# Patient Record
Sex: MALE | Race: WHITE | NOT HISPANIC OR LATINO | URBAN - METROPOLITAN AREA
[De-identification: names, ages, dates, MRNs, and addresses within clinical notes are randomized per-mention and may not be internally consistent; named-entity substitution may affect disease eponyms.]

---

## 2017-06-22 ENCOUNTER — OUTPATIENT (OUTPATIENT)
Dept: OUTPATIENT SERVICES | Facility: HOSPITAL | Age: 27
LOS: 1 days | End: 2017-06-22
Payer: COMMERCIAL

## 2017-06-22 DIAGNOSIS — K08.9 DISORDER OF TEETH AND SUPPORTING STRUCTURES, UNSPECIFIED: ICD-10-CM

## 2017-06-22 PROCEDURE — D1110: CPT

## 2017-06-22 PROCEDURE — D0120: CPT

## 2017-06-26 DIAGNOSIS — Z01.20 ENCOUNTER FOR DENTAL EXAMINATION AND CLEANING WITHOUT ABNORMAL FINDINGS: ICD-10-CM

## 2017-07-31 ENCOUNTER — INPATIENT (INPATIENT)
Facility: HOSPITAL | Age: 27
LOS: 2 days | Discharge: HOME | DRG: 394 | End: 2017-08-03
Attending: INTERNAL MEDICINE | Admitting: INTERNAL MEDICINE
Payer: COMMERCIAL

## 2017-07-31 VITALS
HEART RATE: 77 BPM | DIASTOLIC BLOOD PRESSURE: 59 MMHG | RESPIRATION RATE: 22 BRPM | WEIGHT: 104.94 LBS | SYSTOLIC BLOOD PRESSURE: 74 MMHG | HEIGHT: 60 IN | OXYGEN SATURATION: 97 %

## 2017-07-31 DIAGNOSIS — L03.90 CELLULITIS, UNSPECIFIED: ICD-10-CM

## 2017-07-31 DIAGNOSIS — L03.311 CELLULITIS OF ABDOMINAL WALL: ICD-10-CM

## 2017-07-31 LAB
ALBUMIN SERPL ELPH-MCNC: 4.1 G/DL — SIGNIFICANT CHANGE UP (ref 3.3–5)
ALP SERPL-CCNC: 59 U/L — SIGNIFICANT CHANGE UP (ref 40–120)
ALT FLD-CCNC: 20 U/L RC — SIGNIFICANT CHANGE UP (ref 10–45)
ANION GAP SERPL CALC-SCNC: 15 MMOL/L — SIGNIFICANT CHANGE UP (ref 5–17)
APPEARANCE UR: CLEAR — SIGNIFICANT CHANGE UP
AST SERPL-CCNC: 26 U/L — SIGNIFICANT CHANGE UP (ref 10–40)
BASOPHILS # BLD AUTO: 0 K/UL — SIGNIFICANT CHANGE UP (ref 0–0.2)
BASOPHILS NFR BLD AUTO: 0.2 % — SIGNIFICANT CHANGE UP (ref 0–2)
BILIRUB SERPL-MCNC: 0.3 MG/DL — SIGNIFICANT CHANGE UP (ref 0.2–1.2)
BILIRUB UR-MCNC: NEGATIVE — SIGNIFICANT CHANGE UP
BUN SERPL-MCNC: 14 MG/DL — SIGNIFICANT CHANGE UP (ref 7–23)
CALCIUM SERPL-MCNC: 10.1 MG/DL — SIGNIFICANT CHANGE UP (ref 8.4–10.5)
CHLORIDE SERPL-SCNC: 95 MMOL/L — LOW (ref 96–108)
CO2 SERPL-SCNC: 26 MMOL/L — SIGNIFICANT CHANGE UP (ref 22–31)
COLOR SPEC: YELLOW — SIGNIFICANT CHANGE UP
CREAT SERPL-MCNC: 0.39 MG/DL — LOW (ref 0.5–1.3)
DIFF PNL FLD: NEGATIVE — SIGNIFICANT CHANGE UP
EOSINOPHIL # BLD AUTO: 0 K/UL — SIGNIFICANT CHANGE UP (ref 0–0.5)
EOSINOPHIL NFR BLD AUTO: 0.1 % — SIGNIFICANT CHANGE UP (ref 0–6)
GAS PNL BLDV: SIGNIFICANT CHANGE UP
GLUCOSE SERPL-MCNC: 99 MG/DL — SIGNIFICANT CHANGE UP (ref 70–99)
GLUCOSE UR QL: NEGATIVE — SIGNIFICANT CHANGE UP
HCT VFR BLD CALC: 48.3 % — SIGNIFICANT CHANGE UP (ref 39–50)
HGB BLD-MCNC: 16.3 G/DL — SIGNIFICANT CHANGE UP (ref 13–17)
KETONES UR-MCNC: NEGATIVE — SIGNIFICANT CHANGE UP
LEUKOCYTE ESTERASE UR-ACNC: NEGATIVE — SIGNIFICANT CHANGE UP
LYMPHOCYTES # BLD AUTO: 17.5 % — SIGNIFICANT CHANGE UP (ref 13–44)
LYMPHOCYTES # BLD AUTO: 3.4 K/UL — HIGH (ref 1–3.3)
MCHC RBC-ENTMCNC: 31.6 PG — SIGNIFICANT CHANGE UP (ref 27–34)
MCHC RBC-ENTMCNC: 33.8 GM/DL — SIGNIFICANT CHANGE UP (ref 32–36)
MCV RBC AUTO: 93.4 FL — SIGNIFICANT CHANGE UP (ref 80–100)
MONOCYTES # BLD AUTO: 2.3 K/UL — HIGH (ref 0–0.9)
MONOCYTES NFR BLD AUTO: 11.7 % — SIGNIFICANT CHANGE UP (ref 2–14)
NEUTROPHILS # BLD AUTO: 13.8 K/UL — HIGH (ref 1.8–7.4)
NEUTROPHILS NFR BLD AUTO: 70.4 % — SIGNIFICANT CHANGE UP (ref 43–77)
NITRITE UR-MCNC: NEGATIVE — SIGNIFICANT CHANGE UP
PH UR: 7 — SIGNIFICANT CHANGE UP (ref 5–8)
PLATELET # BLD AUTO: 360 K/UL — SIGNIFICANT CHANGE UP (ref 150–400)
POTASSIUM SERPL-MCNC: 4.6 MMOL/L — SIGNIFICANT CHANGE UP (ref 3.5–5.3)
POTASSIUM SERPL-SCNC: 4.6 MMOL/L — SIGNIFICANT CHANGE UP (ref 3.5–5.3)
PROT SERPL-MCNC: 7.9 G/DL — SIGNIFICANT CHANGE UP (ref 6–8.3)
PROT UR-MCNC: 30 MG/DL
RBC # BLD: 5.18 M/UL — SIGNIFICANT CHANGE UP (ref 4.2–5.8)
RBC # FLD: 12.2 % — SIGNIFICANT CHANGE UP (ref 10.3–14.5)
RBC CASTS # UR COMP ASSIST: SIGNIFICANT CHANGE UP /HPF (ref 0–2)
SODIUM SERPL-SCNC: 136 MMOL/L — SIGNIFICANT CHANGE UP (ref 135–145)
SP GR SPEC: 1.02 — SIGNIFICANT CHANGE UP (ref 1.01–1.02)
UROBILINOGEN FLD QL: NEGATIVE — SIGNIFICANT CHANGE UP
WBC # BLD: 19.5 K/UL — HIGH (ref 3.8–10.5)
WBC # FLD AUTO: 19.5 K/UL — HIGH (ref 3.8–10.5)
WBC UR QL: SIGNIFICANT CHANGE UP /HPF (ref 0–5)

## 2017-07-31 PROCEDURE — 93010 ELECTROCARDIOGRAM REPORT: CPT

## 2017-07-31 PROCEDURE — 74000: CPT | Mod: 26

## 2017-07-31 PROCEDURE — 99285 EMERGENCY DEPT VISIT HI MDM: CPT | Mod: 25

## 2017-07-31 PROCEDURE — 99222 1ST HOSP IP/OBS MODERATE 55: CPT

## 2017-07-31 PROCEDURE — 71010: CPT | Mod: 26

## 2017-07-31 RX ORDER — HEPARIN SODIUM 5000 [USP'U]/ML
5000 INJECTION INTRAVENOUS; SUBCUTANEOUS EVERY 12 HOURS
Qty: 0 | Refills: 0 | Status: DISCONTINUED | OUTPATIENT
Start: 2017-07-31 | End: 2017-08-03

## 2017-07-31 RX ORDER — VANCOMYCIN HCL 1 G
1000 VIAL (EA) INTRAVENOUS ONCE
Qty: 0 | Refills: 0 | Status: COMPLETED | OUTPATIENT
Start: 2017-07-31 | End: 2017-07-31

## 2017-07-31 RX ORDER — CEFEPIME 1 G/1
2000 INJECTION, POWDER, FOR SOLUTION INTRAMUSCULAR; INTRAVENOUS EVERY 12 HOURS
Qty: 0 | Refills: 0 | Status: DISCONTINUED | OUTPATIENT
Start: 2017-07-31 | End: 2017-08-03

## 2017-07-31 RX ORDER — SODIUM CHLORIDE 9 MG/ML
1000 INJECTION, SOLUTION INTRAVENOUS ONCE
Qty: 0 | Refills: 0 | Status: COMPLETED | OUTPATIENT
Start: 2017-07-31 | End: 2017-07-31

## 2017-07-31 RX ORDER — PANTOPRAZOLE SODIUM 20 MG/1
40 TABLET, DELAYED RELEASE ORAL DAILY
Qty: 0 | Refills: 0 | Status: DISCONTINUED | OUTPATIENT
Start: 2017-07-31 | End: 2017-08-03

## 2017-07-31 RX ORDER — BACITRACIN ZINC 500 UNIT/G
1 OINTMENT IN PACKET (EA) TOPICAL
Qty: 0 | Refills: 0 | Status: DISCONTINUED | OUTPATIENT
Start: 2017-07-31 | End: 2017-08-03

## 2017-07-31 RX ORDER — CEFEPIME 1 G/1
2000 INJECTION, POWDER, FOR SOLUTION INTRAMUSCULAR; INTRAVENOUS ONCE
Qty: 0 | Refills: 0 | Status: COMPLETED | OUTPATIENT
Start: 2017-07-31 | End: 2017-07-31

## 2017-07-31 RX ORDER — VALPROIC ACID (AS SODIUM SALT) 250 MG/5ML
250 SOLUTION, ORAL ORAL EVERY 8 HOURS
Qty: 0 | Refills: 0 | Status: DISCONTINUED | OUTPATIENT
Start: 2017-07-31 | End: 2017-08-02

## 2017-07-31 RX ORDER — IPRATROPIUM/ALBUTEROL SULFATE 18-103MCG
3 AEROSOL WITH ADAPTER (GRAM) INHALATION EVERY 6 HOURS
Qty: 0 | Refills: 0 | Status: DISCONTINUED | OUTPATIENT
Start: 2017-07-31 | End: 2017-08-03

## 2017-07-31 RX ORDER — VALPROIC ACID (AS SODIUM SALT) 250 MG/5ML
400 SOLUTION, ORAL ORAL ONCE
Qty: 0 | Refills: 0 | Status: COMPLETED | OUTPATIENT
Start: 2017-07-31 | End: 2017-07-31

## 2017-07-31 RX ORDER — SODIUM CHLORIDE 9 MG/ML
1000 INJECTION, SOLUTION INTRAVENOUS
Qty: 0 | Refills: 0 | Status: DISCONTINUED | OUTPATIENT
Start: 2017-07-31 | End: 2017-08-03

## 2017-07-31 RX ADMIN — HEPARIN SODIUM 5000 UNIT(S): 5000 INJECTION INTRAVENOUS; SUBCUTANEOUS at 21:56

## 2017-07-31 RX ADMIN — Medication 250 MILLIGRAM(S): at 13:59

## 2017-07-31 RX ADMIN — SODIUM CHLORIDE 1000 MILLILITER(S): 9 INJECTION, SOLUTION INTRAVENOUS at 13:59

## 2017-07-31 RX ADMIN — Medication 26.25 MILLIGRAM(S): at 21:56

## 2017-07-31 RX ADMIN — SODIUM CHLORIDE 100 MILLILITER(S): 9 INJECTION, SOLUTION INTRAVENOUS at 21:57

## 2017-07-31 RX ADMIN — CEFEPIME 100 MILLIGRAM(S): 1 INJECTION, POWDER, FOR SOLUTION INTRAMUSCULAR; INTRAVENOUS at 15:11

## 2017-07-31 NOTE — ED PROVIDER NOTE - ATTENDING CONTRIBUTION TO CARE
Attending MD Mosher:  I personally have seen and examined this patient.  Resident note reviewed and agree on plan of care and except where noted.  See HPI, PE, and MDM for details.              26M with CP, seizure disorder presenting from home with report of dislodged G tube, replaced by pt's mother 1 day prior, pt arrives tachycardic with low grade hypoxia, small amount of erythema around G tube site, will perform G tube study, rectal temp, labs to screen for possible sepsis

## 2017-07-31 NOTE — CONSULT NOTE ADULT - SUBJECTIVE AND OBJECTIVE BOX
HPI: 26 M PMh CP, developmental delay, seizures, Scoliosis, RAD       PAST MEDICAL & SURGICAL HISTORY:  History of Scoliosis  Mental Retardation  CP (Cerebral Palsy)  RAD (Reactive Airway Disease)  Seizure Disorder  Hip Dislocation: s/p repair ,   Tendon Contracture: ankles b/l  Status Post Unilateral Inguinal Hernia Repair: Undescended testes b/l  Status Post Myringotomy with Insertion of Tube: 1999x2, 1994x1,1995x1, 1996x1,1998 x2  Status Post Nissen Fundoplication (with Gastrostomy Tube Placement)  Status Post Nissen Fundoplication (with Gastrostomy Tube Placement)  Status Post Gastrostomy Tube (G Tube) Placement, Follow-Up Exam      Allergies  No Known Allergies        ANTIMICROBIALS:      OTHER MEDS: MEDICATIONS  (STANDING):  valproic  acid Syrup 400 Once      SOCIAL HISTORY:  [ ] etoh [ ] tobacco [ ] former smoker [ ] IVDU    FAMILY HISTORY:      REVIEW OF SYSTEMS  [  ] ROS unobtainable because:  nonverbal  [  ] All other systems negative except as noted below:	    Constitutional:  [ ] fever [ ] weight loss  Skin:  [ ] rash [ ] phlebitis	  Eyes: [ ] icterus [ ] inflammation	  ENMT: [ ] discharge [ ] thrush [ ] ulcers [ ] exudates  Respiratory: [ ] dyspnea [ ] hemoptysis [ ] cough [ ] sputum	  Cardiovascular:  [ ] chest pain [ ] palpitations [ ] edema	  Gastrointestinal:  [ ] nausea [ ] vomiting [ ] diarrhea [ ] constipation [ ] pain	  Genitourinary:  [ ] dysuria [ ] frequency [ ] hematuria [ ] discharge [ ] flank pain  Musculoskeletal:  [ ] myalgias [ ] arthralgias [ ] arthritis	  Neurological:  [ ] headache [ ] seizures	  Psychiatric:  [ ] anxiety [ ] depression	  Hematology/Lymphatics:  [ ] lymphadenopathy  Endocrine:  [ ] adrenal [ ] thyroid  Allergic/Immunologic:	 [ ] transplant [ ] seasonal    Vital Signs Last 24 Hrs  T(F): 99.7 (17 @ 13:59), Max: 99.7 (17 @ 13:59)    Vital Signs Last 24 Hrs  HR: 108 (17 @ 15:11) (77 - 115)  BP: 106/80 (17 @ 13:59) (74/59 - 115/78)  RR: 18 (17 @ 15:11)  SpO2: 98% (17 @ 15:11) (97% - 98%)  Wt(kg): --    PHYSICAL EXAM:  General: non-toxic  HEAD/EYES: anicteric, PERRL  ENT:  supple  Cardiovascular:   S1, S2  Respiratory:  clear bilaterally  GI:  soft, non-tender, normal bowel sounds  :  no CVA tenderness   Musculoskeletal:  no synovitis  Neurologic:  grossly non-focal  Skin:  no rash  Lymph: no lymphadenopathy  Psychiatric:  appropriate affect  Vascular:  no phlebitis                                16.3   19.5  )-----------( 360      ( 2017 13:02 )             48.3           136  |  95<L>  |  14  ----------------------------<  99  4.6   |  26  |  0.39<L>    Ca    10.1      2017 13:02    TPro  7.9  /  Alb  4.1  /  TBili  0.3  /  DBili  x   /  AST  26  /  ALT  20  /  AlkPhos  59        Urinalysis Basic - ( 2017 13:56 )    Color: Yellow / Appearance: Clear / S.020 / pH: x  Gluc: x / Ketone: Negative  / Bili: Negative / Urobili: Negative   Blood: x / Protein: 30 mg/dL / Nitrite: Negative   Leuk Esterase: Negative / RBC: 0-2 /HPF / WBC 0-2 /HPF   Sq Epi: x / Non Sq Epi: x / Bacteria: x        MICROBIOLOGY:          v            RADIOLOGY: HPI: 26 M PMh CP, developmental delay, seizures, Scoliosis, RAD  brought in by family after they noticed PEG tube site was dislodged last night. Patient has had a PEG tube since age 2 and has it replaced every year. Current tube was replaced last year. Patients mother noted last night that it was dislodged and fell out, she replaced the entire tube back herself. Patient was brought to Ed today to confirm placement. Denies any h/o fever, diarrhea, vomiting. Patient unable to talk but is able to communicate pain to his family, and his family has not noticed him do so.        PAST MEDICAL & SURGICAL HISTORY:  History of Scoliosis  Mental Retardation  CP (Cerebral Palsy)  RAD (Reactive Airway Disease)  Seizure Disorder  Hip Dislocation: s/p repair ,   Tendon Contracture: ankles b/l  Status Post Unilateral Inguinal Hernia Repair: Undescended testes b/l  Status Post Myringotomy with Insertion of Tube: 1999x2, 1994x1,1995x1, 1996x1,1998 x2  Status Post Nissen Fundoplication (with Gastrostomy Tube Placement)  Status Post Nissen Fundoplication (with Gastrostomy Tube Placement)  Status Post Gastrostomy Tube (G Tube) Placement, Follow-Up Exam      Allergies  No Known Allergies        ANTIMICROBIALS:      OTHER MEDS: MEDICATIONS  (STANDING):  valproic  acid Syrup 400 Once      SOCIAL HISTORY:  [ ] etoh [ ] tobacco [ ] former smoker [ ] IVDU    FAMILY HISTORY:      REVIEW OF SYSTEMS  [  ] ROS unobtainable because:  nonverbal  [  ] All other systems negative except as noted below:	    Constitutional:  [ ] fever [ ] weight loss  Skin:  [ ] rash [ ] phlebitis	  Eyes: [ ] icterus [ ] inflammation	  ENMT: [ ] discharge [ ] thrush [ ] ulcers [ ] exudates  Respiratory: [ ] dyspnea [ ] hemoptysis [ ] cough [ ] sputum	  Cardiovascular:  [ ] chest pain [ ] palpitations [ ] edema	  Gastrointestinal:  [ ] nausea [ ] vomiting [ ] diarrhea [ ] constipation [ ] pain	  Genitourinary:  [ ] dysuria [ ] frequency [ ] hematuria [ ] discharge [ ] flank pain  Musculoskeletal:  [ ] myalgias [ ] arthralgias [ ] arthritis	  Neurological:  [ ] headache [ ] seizures	  Psychiatric:  [ ] anxiety [ ] depression	  Hematology/Lymphatics:  [ ] lymphadenopathy  Endocrine:  [ ] adrenal [ ] thyroid  Allergic/Immunologic:	 [ ] transplant [ ] seasonal    Vital Signs Last 24 Hrs  T(F): 99.7 (17 @ 13:59), Max: 99.7 (17 @ 13:59)    Vital Signs Last 24 Hrs  HR: 108 (17 @ 15:11) (77 - 115)  BP: 106/80 (17 @ 13:59) (74/59 - 115/78)  RR: 18 (17 @ 15:11)  SpO2: 98% (17 @ 15:11) (97% - 98%)  Wt(kg): --    PHYSICAL EXAM:  General: non-toxic, moaning incoherently, contracted extremities  HEAD/EYES: anicteric, PERRL  ENT:  supple  Cardiovascular:   S1, S2 tachycardic  Respiratory:  clear bilaterally  GI:  soft, non-tender, normal bowel sounds PEG site no drainage, firm above peg tube site with erythema  :  no CVA tenderness   Musculoskeletal:  no synovitis  Neurologic:  grossly non-focal  Skin:  sunburn over UE and left LE  Lymph: no lymphadenopathy  Psychiatric:  cannot assess  Vascular:  no phlebitis                                16.3   19.5  )-----------( 360      ( 2017 13:02 )             48.3           136  |  95<L>  |  14  ----------------------------<  99  4.6   |  26  |  0.39<L>    Ca    10.1      2017 13:02    TPro  7.9  /  Alb  4.1  /  TBili  0.3  /  DBili  x   /  AST  26  /  ALT  20  /  AlkPhos  59        Urinalysis Basic - ( 2017 13:56 )    Color: Yellow / Appearance: Clear / S.020 / pH: x  Gluc: x / Ketone: Negative  / Bili: Negative / Urobili: Negative   Blood: x / Protein: 30 mg/dL / Nitrite: Negative   Leuk Esterase: Negative / RBC: 0-2 /HPF / WBC 0-2 /HPF   Sq Epi: x / Non Sq Epi: x / Bacteria: x        MICROBIOLOGY:          v            RADIOLOGY:  < from: Xray Chest 1 View AP- PORTABLE-Urgent (17 @ 13:24) >    Impression:    The heart is normal in size. Poor inspiratory effort. The lungs appear to   be clear. Severe right thoracic scoliosis is evident.    < end of copied text > HPI: 26 M PMh CP, developmental delay, seizures, Scoliosis, RAD  brought in by family after they noticed PEG tube site was dislodged last night. Patient has had a PEG tube since age 2 and has it replaced every year. Current tube was replaced last year. Patients mother noted last night that it was dislodged and fell out, she replaced the entire tube back herself. Patient was brought to Ed today to confirm placement. Denies any h/o fever, diarrhea, vomiting. Patient unable to talk but is able to communicate pain to his family, and his family has not noticed him do so.        PAST MEDICAL & SURGICAL HISTORY:  History of Scoliosis  Mental Retardation  CP (Cerebral Palsy)  RAD (Reactive Airway Disease)  Seizure Disorder  Hip Dislocation: s/p repair ,   Tendon Contracture: ankles b/l  Status Post Unilateral Inguinal Hernia Repair: Undescended testes b/l  Status Post Myringotomy with Insertion of Tube: 1999x2, 1994x1,1995x1, 1996x1,1998 x2  Status Post Nissen Fundoplication (with Gastrostomy Tube Placement)  Status Post Nissen Fundoplication (with Gastrostomy Tube Placement)  Status Post Gastrostomy Tube (G Tube) Placement, Follow-Up Exam      Allergies  No Known Allergies        ANTIMICROBIALS:      OTHER MEDS: MEDICATIONS  (STANDING):  valproic  acid Syrup 400 Once      SOCIAL HISTORY:  [ ] etoh [ ] tobacco [ ] former smoker [ ] IVDU    FAMILY HISTORY:      REVIEW OF SYSTEMS  [  ] ROS unobtainable because:  nonverbal  [  ] All other systems negative except as noted below:	    Constitutional:  [ ] fever [ ] weight loss  Skin:  [ ] rash [ ] phlebitis	  Eyes: [ ] icterus [ ] inflammation	  ENMT: [ ] discharge [ ] thrush [ ] ulcers [ ] exudates  Respiratory: [ ] dyspnea [ ] hemoptysis [ ] cough [ ] sputum	  Cardiovascular:  [ ] chest pain [ ] palpitations [ ] edema	  Gastrointestinal:  [ ] nausea [ ] vomiting [ ] diarrhea [ ] constipation [ ] pain	  Genitourinary:  [ ] dysuria [ ] frequency [ ] hematuria [ ] discharge [ ] flank pain  Musculoskeletal:  [ ] myalgias [ ] arthralgias [ ] arthritis	  Neurological:  [ ] headache [ ] seizures	  Psychiatric:  [ ] anxiety [ ] depression	  Hematology/Lymphatics:  [ ] lymphadenopathy  Endocrine:  [ ] adrenal [ ] thyroid  Allergic/Immunologic:	 [ ] transplant [ ] seasonal    Vital Signs Last 24 Hrs  T(F): 99.7 (17 @ 13:59), Max: 99.7 (17 @ 13:59)    Vital Signs Last 24 Hrs  HR: 108 (17 @ 15:11) (77 - 115)  BP: 106/80 (17 @ 13:59) (74/59 - 115/78)  RR: 18 (17 @ 15:11)  SpO2: 98% (17 @ 15:11) (97% - 98%)  Wt(kg): --    PHYSICAL EXAM:  General: non-toxic, moaning incoherently, contracted extremities  HEAD/EYES: anicteric, PERRL  ENT:  supple  Cardiovascular:   S1, S2 tachycardic  Respiratory:  clear bilaterally  GI:  soft, non-tender, normal bowel sounds PEG site no drainage, firm above peg tube site with erythema  :  no CVA tenderness   Musculoskeletal:  no synovitis left hip surgical scar  Neurologic:  grossly non-focal  Skin:  sunburn over UE and left LE  Lymph: no lymphadenopathy  Psychiatric:  cannot assess  Vascular:  no phlebitis                                16.3   19.5  )-----------( 360      ( 2017 13:02 )             48.3           136  |  95<L>  |  14  ----------------------------<  99  4.6   |  26  |  0.39<L>    Ca    10.1      2017 13:02    TPro  7.9  /  Alb  4.1  /  TBili  0.3  /  DBili  x   /  AST  26  /  ALT  20  /  AlkPhos  59        Urinalysis Basic - ( 2017 13:56 )    Color: Yellow / Appearance: Clear / S.020 / pH: x  Gluc: x / Ketone: Negative  / Bili: Negative / Urobili: Negative   Blood: x / Protein: 30 mg/dL / Nitrite: Negative   Leuk Esterase: Negative / RBC: 0-2 /HPF / WBC 0-2 /HPF   Sq Epi: x / Non Sq Epi: x / Bacteria: x        MICROBIOLOGY:          v            RADIOLOGY:  < from: Xray Chest 1 View AP- PORTABLE-Urgent (17 @ 13:24) >    Impression:    The heart is normal in size. Poor inspiratory effort. The lungs appear to   be clear. Severe right thoracic scoliosis is evident.    < end of copied text >

## 2017-07-31 NOTE — H&P ADULT - HISTORY OF PRESENT ILLNESS
The patient is a 26 yr old male w/ cerebral palsy/ metal retardation  had a peg tube placed when he was [age] years old. Since that time he has received his feeds as well as medications through it. However, yesterday after his 2PM feed his tube became dislodged at some point, as his caregiver/mother found at 9PM while changing him to prepare for bed. At that time they deflated the tube's cuff and attempted to replace it. However, they are uncertain if his tube is in good placement, and have subsequently not fed him or been able to give him his morning seizure medication, thus prompting them to bring him in to the ED. His family and caregiver deny any recent fevers, cough, cold symptoms, abnormal behavior, seizure activity, or other new symptoms of concern.    History is obtained from the patient's caregiver and family as the patient is non-verbal at baseline.

## 2017-07-31 NOTE — H&P ADULT - DOES THIS PATIENT HAVE A HISTORY OF OR HAS BEEN DX WITH HEART FAILURE?
no Rotation Flap Text: The defect edges were debeveled with a #15 scalpel blade.  Given the location of the defect, shape of the defect and the proximity to free margins a rotation flap was deemed most appropriate.  Using a sterile surgical marker, an appropriate rotation flap was drawn incorporating the defect and placing the expected incisions within the relaxed skin tension lines where possible.    The area thus outlined was incised deep to adipose tissue with a #15 scalpel blade.  The skin margins were undermined to an appropriate distance in all directions utilizing iris scissors.

## 2017-07-31 NOTE — ED PROVIDER NOTE - PMH
CP (Cerebral Palsy)    History of Scoliosis    Mental Retardation    RAD (Reactive Airway Disease)    Seizure Disorder

## 2017-07-31 NOTE — CONSULT NOTE ADULT - SUBJECTIVE AND OBJECTIVE BOX
PULMONARY CONSULT    HPI: 25 y/o M with PMH of CP, MR, seizure disorder, severe restrictive lung disease 2nd scoliosis and thoracic cage deformity, PEG tube placement with history of Nissen fundoplication presents after PEG became dislodged at home yesterday. Replaced by his mother but he was brought in to ER after the PEG site was noted to be erythematous, concerning for cellulitis. He was noted to be hypotensive in the ER with leukocytosis. His CXR is difficult to interpret given his scoliosis and thoracic cage deformity and there was a concern for possible PNA. Called to eval. Patient is non-verbal, unable to obtain ROS. Per his father at bedside he has oxygen at home but rarely needs it. Was not noted to be coughing at home or having any difficulty breathing.     PAST MEDICAL & SURGICAL HISTORY:  History of Scoliosis  Mental Retardation  CP (Cerebral Palsy)  RAD (Reactive Airway Disease)  Seizure Disorder  Hip Dislocation: s/p repair ,   Tendon Contracture: ankles b/l  Status Post Unilateral Inguinal Hernia Repair: Undescended testes b/l  Status Post Myringotomy with Insertion of Tube: 1999x2, 1994x1,1995x1, 1996x1,1998 x2  Status Post Nissen Fundoplication (with Gastrostomy Tube Placement)  Status Post Nissen Fundoplication (with Gastrostomy Tube Placement)  Status Post Gastrostomy Tube (G Tube) Placement, Follow-Up Exam    Allergies    No Known Allergies    Intolerances      FAMILY HISTORY: Non-contributory     Social history: Never Smoker    Review of Systems: Unable to obtain  CONSTITUTIONAL  EYES:   ENMT:   NECK:  RESPIRATORY:   CARDIOVASCULAR:   GASTROINTESTINAL:   GENITOURINARY:   NEUROLOGICAL:   SKIN:   MUSCULOSKELETAL:   PSYCHIATRIC:       Medications:  MEDICATIONS  (STANDING):  valproic  acid Syrup 400 milliGRAM(s) Oral Once  BACItracin   Ointment 1 Application(s) Topical two times a day  valproate sodium IVPB 250 milliGRAM(s) IV Intermittent every 8 hours  dextrose 5% + sodium chloride 0.9%. 1000 milliLiter(s) (100 mL/Hr) IV Continuous <Continuous>  heparin  Injectable 5000 Unit(s) SubCutaneous every 12 hours  pantoprazole  Injectable 40 milliGRAM(s) IV Push daily    MEDICATIONS  (PRN):            Vital Signs Last 24 Hrs  T(C): 37.6 (2017 13:59), Max: 37.6 (2017 13:59)  T(F): 99.7 (2017 13:59), Max: 99.7 (2017 13:59)  HR: 108 (2017 15:11) (77 - 115)  BP: 106/80 (2017 13:59) (74/59 - 115/78)  BP(mean): --  RR: 18 (2017 15:11) (18 - 22)  SpO2: 98% (2017 15:11) (97% - 98%) on RA      VBG pH 7.38  @ 13:02  VBG pCO2 53  @ 13:02  VBG O2 sat 66  @ 13:02  VBG lactate 1.5  @ 13:02            LABS:                        16.3   19.5  )-----------( 360      ( 2017 13:02 )             48.3     31    136  |  95<L>  |  14  ----------------------------<  99  4.6   |  26  |  0.39<L>    Ca    10.1      2017 13:02    TPro  7.9  /  Alb  4.1  /  TBili  0.3  /  DBili  x   /  AST  26  /  ALT  20  /  AlkPhos  59  31          CAPILLARY BLOOD GLUCOSE          Urinalysis Basic - ( 2017 13:56 )    Color: Yellow / Appearance: Clear / S.020 / pH: x  Gluc: x / Ketone: Negative  / Bili: Negative / Urobili: Negative   Blood: x / Protein: 30 mg/dL / Nitrite: Negative   Leuk Esterase: Negative / RBC: 0-2 /HPF / WBC 0-2 /HPF   Sq Epi: x / Non Sq Epi: x / Bacteria: x      Procalcitonin, Serum: <0.05 ng/mL (2017 13:07)                CULTURES: (if applicable)        Physical Examination:    General: No acute distress.      HEENT: Pupils equal, reactive to light.  Symmetric.    PULM: Clear to auscultation bilaterally, no significant sputum production    CVS: S1, S2 RRR    ABD: Soft, nondistended, nontender, normoactive bowel sounds, no masses    EXT: No edema, nontender, areas of erythema on bilateral LE    SKIN: PEG site erythema    NEURO: non-verbal. eye opening    RADIOLOGY REVIEWED  CXR: severe scoliosis  grossly clear PULMONARY CONSULT    HPI: 25 y/o M with PMH of CP, MR, seizure disorder, severe restrictive lung disease 2nd scoliosis and thoracic cage deformity, PEG tube placement with history of Nissen fundoplication presents after PEG became dislodged at home yesterday. Replaced by his mother but he was brought in to ER after the PEG site was noted to be erythematous, concerning for cellulitis. He was noted to be hypotensive in the ER with leukocytosis. His CXR is difficult to interpret given his scoliosis and thoracic cage deformity and there was a concern for possible PNA. Called to eval. Patient is non-verbal, unable to obtain ROS. Per his father at bedside he has oxygen at home but rarely needs it. Was not noted to be coughing at home or having any difficulty breathing.     PAST MEDICAL & SURGICAL HISTORY:  History of Scoliosis  Mental Retardation  CP (Cerebral Palsy)  RAD (Reactive Airway Disease)  Seizure Disorder  Hip Dislocation: s/p repair ,   Tendon Contracture: ankles b/l  Status Post Unilateral Inguinal Hernia Repair: Undescended testes b/l  Status Post Myringotomy with Insertion of Tube: 1999x2, 1994x1,1995x1, 1996x1,1998 x2  Status Post Nissen Fundoplication (with Gastrostomy Tube Placement)  Status Post Nissen Fundoplication (with Gastrostomy Tube Placement)  Status Post Gastrostomy Tube (G Tube) Placement, Follow-Up Exam    Allergies    No Known Allergies    Intolerances      FAMILY HISTORY: Non-contributory     Social history: Never Smoker    Review of Systems: Unable to obtain  CONSTITUTIONAL  EYES:   ENMT:   NECK:  RESPIRATORY:   CARDIOVASCULAR:   GASTROINTESTINAL:   GENITOURINARY:   NEUROLOGICAL:   SKIN:   MUSCULOSKELETAL:   PSYCHIATRIC:       Medications:  MEDICATIONS  (STANDING):  valproic  acid Syrup 400 milliGRAM(s) Oral Once  BACItracin   Ointment 1 Application(s) Topical two times a day  valproate sodium IVPB 250 milliGRAM(s) IV Intermittent every 8 hours  dextrose 5% + sodium chloride 0.9%. 1000 milliLiter(s) (100 mL/Hr) IV Continuous <Continuous>  heparin  Injectable 5000 Unit(s) SubCutaneous every 12 hours  pantoprazole  Injectable 40 milliGRAM(s) IV Push daily    MEDICATIONS  (PRN):            Vital Signs Last 24 Hrs  T(C): 37.6 (2017 13:59), Max: 37.6 (2017 13:59)  T(F): 99.7 (2017 13:59), Max: 99.7 (2017 13:59)  HR: 108 (2017 15:11) (77 - 115)  BP: 106/80 (2017 13:59) (74/59 - 115/78)  BP(mean): --  RR: 18 (2017 15:11) (18 - 22)  SpO2: 98% (2017 15:11) (97% - 98%) on RA      VBG pH 7.38  @ 13:02  VBG pCO2 53  @ 13:02  VBG O2 sat 66  @ 13:02  VBG lactate 1.5  @ 13:02            LABS:                        16.3   19.5  )-----------( 360      ( 2017 13:02 )             48.3     31    136  |  95<L>  |  14  ----------------------------<  99  4.6   |  26  |  0.39<L>    Ca    10.1      2017 13:02    TPro  7.9  /  Alb  4.1  /  TBili  0.3  /  DBili  x   /  AST  26  /  ALT  20  /  AlkPhos  59  31          CAPILLARY BLOOD GLUCOSE          Urinalysis Basic - ( 2017 13:56 )    Color: Yellow / Appearance: Clear / S.020 / pH: x  Gluc: x / Ketone: Negative  / Bili: Negative / Urobili: Negative   Blood: x / Protein: 30 mg/dL / Nitrite: Negative   Leuk Esterase: Negative / RBC: 0-2 /HPF / WBC 0-2 /HPF   Sq Epi: x / Non Sq Epi: x / Bacteria: x      Procalcitonin, Serum: <0.05 ng/mL (2017 13:07)    CULTURES: (if applicable)        Physical Examination:    General: No acute distress.      HEENT: Pupils equal, reactive to light.  Symmetric.    PULM: Clear to auscultation bilaterally, no significant sputum production    CVS: S1, S2 RRR    ABD: Soft, nondistended, nontender, normoactive bowel sounds, no masses    EXT: No edema, nontender, areas of erythema on bilateral LE    SKIN: PEG site erythema    NEURO: non-verbal. eye opening    RADIOLOGY REVIEWED  CXR: severe scoliosis  grossly clear

## 2017-07-31 NOTE — CONSULT NOTE ADULT - SUBJECTIVE AND OBJECTIVE BOX
Patient is a 26y old  Male who presents with a chief complaint of dislodged peg    HPI: pt is a 26 year old man with hx of cp.  he had dislodged peg.  mother and father brought to ed.  patient with replacment peg.  concern regarding leukocytosis      PAST MEDICAL & SURGICAL HISTORY:  History of Scoliosis  Mental Retardation  CP (Cerebral Palsy)  RAD (Reactive Airway Disease)  Seizure Disorder  Hip Dislocation: s/p repair 1992, 1996  Tendon Contracture: ankles b/l  Status Post Unilateral Inguinal Hernia Repair: Undescended testes b/l  Status Post Myringotomy with Insertion of Tube: 1999x2, 1994x1,1995x1, 1996x1,1998 x2  Status Post Nissen Fundoplication (with Gastrostomy Tube Placement)  Status Post Nissen Fundoplication (with Gastrostomy Tube Placement)  Status Post Gastrostomy Tube (G Tube) Placement, Follow-Up Exam      MEDICATIONS  (STANDING):  valproic  acid Syrup 400 milliGRAM(s) Oral Once  BACItracin   Ointment 1 Application(s) Topical two times a day  valproate sodium IVPB 250 milliGRAM(s) IV Intermittent every 8 hours  dextrose 5% + sodium chloride 0.9%. 1000 milliLiter(s) (100 mL/Hr) IV Continuous <Continuous>  heparin  Injectable 5000 Unit(s) SubCutaneous every 12 hours  pantoprazole  Injectable 40 milliGRAM(s) IV Push daily      Allergies    No Known Allergies    Intolerances        SOCIAL HISTORY:  Denies ETOh,Smoking,     FAMILY HISTORY:      REVIEW OF SYSTEMS:    CONSTITUTIONAL: No weakness, fevers or chills  EYES/ENT: No visual changes;  No vertigo or throat pain   NECK: No pain or stiffness  RESPIRATORY: No cough, wheezing, hemoptysis; No shortness of breath  CARDIOVASCULAR: No chest pain or palpitations  GASTROINTESTINAL: No abdominal or epigastric pain. No nausea, vomiting, or hematemesis; No diarrhea or constipation. No melena or hematochezia.  GENITOURINARY: No dysuria, frequency or hematuria  NEUROLOGICAL: No numbness or weakness  SKIN: No itching, burning, rashes, or lesions   All other review of systems is negative unless indicated above.    VITAL:  T(C): , Max: 37.6 (07-31-17 @ 13:59)  T(F): , Max: 99.7 (07-31-17 @ 13:59)  HR: 108 (07-31-17 @ 15:11)  BP: 106/80 (07-31-17 @ 13:59)  BP(mean): --  RR: 18 (07-31-17 @ 15:11)  SpO2: 98% (07-31-17 @ 15:11)  Wt(kg): --    I and O's:    Height (cm): 149.86 (07-31 @ 11:51)  Weight (kg): 47.6 (07-31 @ 11:51)  BMI (kg/m2): 21.2 (07-31 @ 11:51)  BSA (m2): 1.4 (07-31 @ 11:51)    PHYSICAL EXAM:    Constitutional: NAD  HEENT: PERRLA,   Neck: No JVD  Respiratory: CTA B/L  Cardiovascular: S1 and S2  Gastrointestinal: BS+, soft, NT/ND  Extremities: No peripheral edema  Neurological: A/O x 3, no focal deficits  Psychiatric: Normal mood, normal affect  : No Rueda  Skin: No rashes  Access: Not applicable  Back: No CVA tenderness    LABS:                        16.3   19.5  )-----------( 360      ( 31 Jul 2017 13:02 )             48.3     07-31    136  |  95<L>  |  14  ----------------------------<  99  4.6   |  26  |  0.39<L>    Ca    10.1      31 Jul 2017 13:02    TPro  7.9  /  Alb  4.1  /  TBili  0.3  /  DBili  x   /  AST  26  /  ALT  20  /  AlkPhos  59  07-31

## 2017-07-31 NOTE — CONSULT NOTE ADULT - ASSESSMENT
25 y/o M with PMH of CP, MR, seizure disorder, severe restrictive lung disease 2nd scoliosis and thoracic cage deformity, PEG tube placement with history of Nissen fundoplication presents after PEG became dislodged at home yesterday and PEG site was noted to be erythematous concerning for cellulitis. In ER he was hypotensive (74/59) and leukocytosis. Asked to eval for r/o PNA

## 2017-07-31 NOTE — ED PROVIDER NOTE - NS ED ROS FT
Unable to obtain as the patient is non-verbal. Patient's family/caregiver deny fevers, cough, cold sx, vomiting, abnormal behavior, or other new sx of concern.

## 2017-07-31 NOTE — ED PROVIDER NOTE - PSH
Hip Dislocation  s/p repair 1992, 1996  Status Post Gastrostomy Tube (G Tube) Placement, Follow-Up Exam    Status Post Myringotomy with Insertion of Tube  1999x2, 1994x1,1995x1, 1996x1,1998 x2  Status Post Nissen Fundoplication (with Gastrostomy Tube Placement)    Status Post Nissen Fundoplication (with Gastrostomy Tube Placement)    Status Post Unilateral Inguinal Hernia Repair  Undescended testes b/l  Tendon Contracture  ankles b/l

## 2017-07-31 NOTE — ED PROVIDER NOTE - PHYSICAL EXAMINATION
Physical Exam:   Gen: NAD, paying laying with flexed extremities, head turned to left with mouth open, does not appear to be in distress.   Eyes: No discharge, conjunctival injection, or periorbital bruising or abrasions.  HEENT: Patient with head turned to left, dentition without obvious abscess, no oral lesions, dry mucous membranes. External auditory canals nl. No rhinorrhea or epistaxis.   CV: Tachycardia w/ regular rhythm.   Resp: clear to auscultation bilaterally  Abd: soft, non tender, no guarding  Back: irregular spinal kyphosis/lordosis  Skin: there is an area of erythema and warmth around the peg tube site measuring approx 8xnd2dz, square in shape, skin is warm and dry, no other obvious rashes.   Neuro: patient is non-verbal, extremities in flexion, diffuse muscle wasting

## 2017-07-31 NOTE — H&P ADULT - NSHPLABSRESULTS_GEN_ALL_CORE
16.3   19.5  )-----------( 360      ( 2017 13:02 )             48.3           136  |  95<L>  |  14  ----------------------------<  99  4.6   |  26  |  0.39<L>    Ca    10.1      2017 13:02    TPro  7.9  /  Alb  4.1  /  TBili  0.3  /  DBili  x   /  AST  26  /  ALT  20  /  AlkPhos  59  -              Urinalysis Basic - ( 2017 13:56 )    Color: Yellow / Appearance: Clear / S.020 / pH: x  Gluc: x / Ketone: Negative  / Bili: Negative / Urobili: Negative   Blood: x / Protein: 30 mg/dL / Nitrite: Negative   Leuk Esterase: Negative / RBC: 0-2 /HPF / WBC 0-2 /HPF   Sq Epi: x / Non Sq Epi: x / Bacteria: x            Lactate Trend            CAPILLARY BLOOD GLUCOSE

## 2017-07-31 NOTE — ED PROVIDER NOTE - CHIEF COMPLAINT
The patient is a 26y Male complaining of see chief complaint quote. The patient is a 26y m w/ hx of CP and MR chronically fed via peg tube presenting for peg tube evalation.

## 2017-07-31 NOTE — H&P ADULT - ASSESSMENT
pt w/ cerebral palsy/ mental retardation  peg dislodgement  eval for placement   npo   iv fluids  gi  ir eval     iv vpa  neuro eval  dvt proph  discussed w/ father at bedside  id eval   ? abd cellulitis  abs as per id

## 2017-07-31 NOTE — CONSULT NOTE ADULT - ASSESSMENT
26 M PMH CP, developmental delay, seizures, Scoliosis, RAD  brought in by family after they noticed PEG tube site was dislodged last night, patient brought to ED found to have redness over PEG site concerning for cellulitis, patient was found to meet SIRS criteria with initial BP of 74/59 and heart rate of 108. BP improved after IVF to 106/80 however patient still tachycardic. Temp 99.8 F. 26 M PMH CP, developmental delay, seizures, Scoliosis, RAD  brought in by family after they noticed PEG tube site was dislodged last night, patient brought to ED found to have redness over PEG site concerning for cellulitis, patient was found to meet SIRS criteria with initial BP of 74/59 and heart rate of 108. BP improved after IVF to 106/80 however patient still tachycardic. Temp 99.8 F.  CXr and UA (-), Procal (-), WBC 19  C/w Cefepime 2 g iv q12h  f/u blood cx  No ID contraindication for exchanging the current  PEG tube

## 2017-07-31 NOTE — CONSULT NOTE ADULT - ASSESSMENT
I agree with concern regarding leukoctosis.  peg looks erythematous but no obvios abcess.  watch cbc in am if wbc still elevated can get ct abdomen pelvis (if no other source)  recommend ir replacemtn of peg with tube check,  has had displaced button peg in past.

## 2017-07-31 NOTE — ED PROVIDER NOTE - OBJECTIVE STATEMENT
The patient had a peg tube placed when he was [age] years old. Since that time he has received his feeds as well as medications through it. However, yesterday after his 2PM feed his tube became dislodged at some point, as his caregiver/mother found at 9PM while changing him to prepare for bed. At that time they deflated the tube's cuff and attempted to replace it. However, they are uncertain if his tube is in good placement, and have subsequently not fed him or been able to give him his morning seizure medication, thus prompting them to bring him in to the ED. His family and caregiver deny any recent fevers, cough, cold symptoms, abnormal behavior, seizure activity, or other new symptoms of concern.    History is obtained from the patient's caregiver and family as the patient is non-verbal at baseline.

## 2017-07-31 NOTE — ED PROVIDER NOTE - MEDICAL DECISION MAKING DETAILS
This patient is a 25 yo M with a hx of CP/MR chronically fed via g-tube presenting with g-tube dislodgement. Upon being triaged his initial blood pressure was significantly hypotensive, due to this room was entered urgently; repeat blood pressure that was wnl. He was placed on monitors at that time and due to redness and warmth surrounding his g-tube site infectious w/u was initiated. Tube study was ordered as tube was found in place. *** This patient is a 27 yo M with a hx of CP/MR chronically fed via g-tube presenting with g-tube dislodgement. Upon being triaged his initial blood pressure was significantly hypotensive, due to this room was entered urgently; repeat blood pressure that was wnl. He was placed on monitors at that time and due to redness and warmth surrounding his g-tube site infectious w/u was initiated. Tube study was ordered as tube was found in good placement, however WBC count was significantly elevated and given the examination findings of erythema and warmth around the PEG tube site and abnormal chest x-ray at this time I am concerned for sepsis. The patient was started on IV antibiotics and *** This patient is a 27 yo M with a hx of CP/MR chronically fed via g-tube presenting with g-tube dislodgement. Upon being triaged his initial blood pressure was significantly hypotensive, due to this room was entered urgently; repeat blood pressure that was wnl. He was placed on monitors at that time and due to redness and warmth surrounding his g-tube site infectious w/u was initiated. Tube study was ordered as tube was found in good placement, however WBC count was significantly elevated and given the examination findings of erythema and warmth around the PEG tube site and abnormal chest x-ray at this time I am concerned for sepsis. The patient was started on IV antibiotics and medicine was paged, however we were unable to reach his listed provider so Dr. Miles was consulted and admitted the patient for further treatment. This patient is a 27 yo M with a hx of CP/MR chronically fed via g-tube presenting with g-tube dislodgement. Upon being triaged his initial blood pressure was significantly hypotensive, due to this room was entered urgently; repeat blood pressure that was wnl. He was placed on monitors at that time and due to redness and warmth surrounding his g-tube site infectious w/u was initiated. Tube study was ordered as tube was found in good placement. His depakote was administered through his g-tube after this, however, his labs resulted showing WBC count was significantly elevated. Given the examination findings of erythema and warmth around the PEG tube site at this time I am concerned for sepsis. Furthermore, given the patient's scoliosis it is difficult to definitively rule out pneumonia. The patient was started on IV antibiotics and medicine was paged, however we were unable to reach his listed provider so Dr. Miles was consulted and admitted the patient for further treatment.

## 2017-07-31 NOTE — CONSULT NOTE ADULT - PROBLEM SELECTOR RECOMMENDATION 9
-While CXR is difficult to interpret given his severe scoliosis and thoracic cage abnormality, he has Nissen Fundoplication so there is less concern for aspiration PNA  -He appears comfortable, normoxic with no sputum production per father at bedside  -Will monitor closely for s/s of PNA  -Hold off on antibiotics (for PNA) for now  -Duoneb PRN (home med)

## 2017-07-31 NOTE — ED PROVIDER NOTE - PROGRESS NOTE DETAILS
Patient to XR for gastrograffin study; contrast appears to be contained within the stomach and duodenum confirming placement of PEG. CXR performed shows significant scoliosis, no obvious infiltrate per my read. patient is laying down and interacting with his family upon entrance to room. family updated on patient's plan of care for possible admission and treatment with antibiotics / lab results. -amj Patient to XR for gastrograffin study; contrast appears to be contained within the stomach and duodenum confirming placement of PEG. CXR performed shows significant scoliosis, no obvious infiltrate per my read.-amj Attending MD Mosher: Dr. Astudillo paged x 2, no response. Admitted to Dr. Miles

## 2017-07-31 NOTE — ED ADULT NURSE NOTE - OBJECTIVE STATEMENT
26y m w/ pmhx of CP and MR chronically fed via peg tube presenting for peg tube evaluation. Pt tachycardic and febrile on arrival. Pt nonverbal, with family and at home nurse who state he is at his baseline status, they deny fevers/change in behavior at home. Family states they found the PEG tube displacement last night after last feeding. Redness noted around PEG tube site. Skin intact, warm. Lungs clear and equal. Pt appears to be in NAD at this time.

## 2017-08-01 DIAGNOSIS — G40.909 EPILEPSY, UNSPECIFIED, NOT INTRACTABLE, WITHOUT STATUS EPILEPTICUS: ICD-10-CM

## 2017-08-01 LAB
ANION GAP SERPL CALC-SCNC: 13 MMOL/L — SIGNIFICANT CHANGE UP (ref 5–17)
BUN SERPL-MCNC: 9 MG/DL — SIGNIFICANT CHANGE UP (ref 7–23)
CALCIUM SERPL-MCNC: 9.5 MG/DL — SIGNIFICANT CHANGE UP (ref 8.4–10.5)
CHLORIDE SERPL-SCNC: 99 MMOL/L — SIGNIFICANT CHANGE UP (ref 96–108)
CO2 SERPL-SCNC: 25 MMOL/L — SIGNIFICANT CHANGE UP (ref 22–31)
CREAT SERPL-MCNC: 0.29 MG/DL — LOW (ref 0.5–1.3)
CULTURE RESULTS: NO GROWTH — SIGNIFICANT CHANGE UP
GLUCOSE SERPL-MCNC: 100 MG/DL — HIGH (ref 70–99)
HCT VFR BLD CALC: 47 % — SIGNIFICANT CHANGE UP (ref 39–50)
HGB BLD-MCNC: 15.5 G/DL — SIGNIFICANT CHANGE UP (ref 13–17)
MCHC RBC-ENTMCNC: 31.2 PG — SIGNIFICANT CHANGE UP (ref 27–34)
MCHC RBC-ENTMCNC: 32.9 GM/DL — SIGNIFICANT CHANGE UP (ref 32–36)
MCV RBC AUTO: 94.8 FL — SIGNIFICANT CHANGE UP (ref 80–100)
PLATELET # BLD AUTO: 321 K/UL — SIGNIFICANT CHANGE UP (ref 150–400)
POTASSIUM SERPL-MCNC: 4.6 MMOL/L — SIGNIFICANT CHANGE UP (ref 3.5–5.3)
POTASSIUM SERPL-SCNC: 4.6 MMOL/L — SIGNIFICANT CHANGE UP (ref 3.5–5.3)
RBC # BLD: 4.96 M/UL — SIGNIFICANT CHANGE UP (ref 4.2–5.8)
RBC # FLD: 12.3 % — SIGNIFICANT CHANGE UP (ref 10.3–14.5)
SODIUM SERPL-SCNC: 137 MMOL/L — SIGNIFICANT CHANGE UP (ref 135–145)
SPECIMEN SOURCE: SIGNIFICANT CHANGE UP
WBC # BLD: 14.1 K/UL — HIGH (ref 3.8–10.5)
WBC # FLD AUTO: 14.1 K/UL — HIGH (ref 3.8–10.5)

## 2017-08-01 PROCEDURE — 49450 REPLACE G/C TUBE PERC: CPT

## 2017-08-01 RX ORDER — VALPROIC ACID (AS SODIUM SALT) 250 MG/5ML
750 SOLUTION, ORAL ORAL ONCE
Qty: 0 | Refills: 0 | Status: COMPLETED | OUTPATIENT
Start: 2017-08-01 | End: 2017-08-01

## 2017-08-01 RX ORDER — PRIMIDONE 250 MG/1
100 TABLET ORAL ONCE
Qty: 0 | Refills: 0 | Status: COMPLETED | OUTPATIENT
Start: 2017-08-01 | End: 2017-08-01

## 2017-08-01 RX ADMIN — Medication 26.25 MILLIGRAM(S): at 13:34

## 2017-08-01 RX ADMIN — CEFEPIME 100 MILLIGRAM(S): 1 INJECTION, POWDER, FOR SOLUTION INTRAMUSCULAR; INTRAVENOUS at 04:25

## 2017-08-01 RX ADMIN — HEPARIN SODIUM 5000 UNIT(S): 5000 INJECTION INTRAVENOUS; SUBCUTANEOUS at 19:40

## 2017-08-01 RX ADMIN — Medication 25 MILLIGRAM(S): at 19:40

## 2017-08-01 RX ADMIN — Medication 1 APPLICATION(S): at 06:03

## 2017-08-01 RX ADMIN — CEFEPIME 100 MILLIGRAM(S): 1 INJECTION, POWDER, FOR SOLUTION INTRAMUSCULAR; INTRAVENOUS at 22:00

## 2017-08-01 RX ADMIN — PRIMIDONE 100 MILLIGRAM(S): 250 TABLET ORAL at 22:00

## 2017-08-01 RX ADMIN — PANTOPRAZOLE SODIUM 40 MILLIGRAM(S): 20 TABLET, DELAYED RELEASE ORAL at 13:34

## 2017-08-01 RX ADMIN — Medication 1 APPLICATION(S): at 19:41

## 2017-08-01 RX ADMIN — Medication 26.25 MILLIGRAM(S): at 06:03

## 2017-08-01 RX ADMIN — SODIUM CHLORIDE 100 MILLILITER(S): 9 INJECTION, SOLUTION INTRAVENOUS at 22:00

## 2017-08-01 RX ADMIN — CEFEPIME 100 MILLIGRAM(S): 1 INJECTION, POWDER, FOR SOLUTION INTRAMUSCULAR; INTRAVENOUS at 15:14

## 2017-08-01 RX ADMIN — HEPARIN SODIUM 5000 UNIT(S): 5000 INJECTION INTRAVENOUS; SUBCUTANEOUS at 06:03

## 2017-08-01 NOTE — PROGRESS NOTE ADULT - ASSESSMENT
IR conversion of PEG to button pending WBC. CT scan abd/pelvis if WBC count remains elevated and no clear cause.

## 2017-08-01 NOTE — PROGRESS NOTE ADULT - ASSESSMENT
27YO M with cerebral palsy s/p PEG, seizure DO on Primidone and VPA, with recent missed doses of AEDs. Pt evaluated after event, appears to be back to baseline. Recommend VPA load for coverage as below and close monitoring for seizures in the setting of missing AEDs.

## 2017-08-01 NOTE — PROGRESS NOTE ADULT - SUBJECTIVE AND OBJECTIVE BOX
Interventional Radiology Brief- Operative Note    Procedure: Gastrostomy exchange    Operators: Sindy    Anesthesia (type): viscous lidocaine administered into tract.    Contrast: 10cc Omni    EBL: none    Findings/Follow up Plan of Care: Gastrostomy tube exchanged with insertion of a new 18 Fr tube. Position confirmed under fluoro. Ok to use.    Specimens Removed: none    Implants: 18Fr gastrostomy    Complications: none    Condition/Disposition: stable / room    Please call Interventional Radiology x [phone] with any questions, concerns, or issues.

## 2017-08-01 NOTE — CONSULT NOTE ADULT - PROBLEM SELECTOR RECOMMENDATION 9
- would increase dose of Depakote to cover as pt is not receiving usual Primidone dose  - monitor for seizures  - Ativan 1mg PRN for seizure activity  - consult attending to see patient in morning

## 2017-08-01 NOTE — CONSULT NOTE ADULT - SUBJECTIVE AND OBJECTIVE BOX
NEUROLOGY CONSULT     Patient is a 26y old male who presents with a chief complaint of evaluation of PEG tube placement, erythema around site.      HPI:  The patient is a 26 yr old male w/ cerebral palsy/ metal retardation had a peg tube placed when he was [age] years old. Since that time he has received his feeds as well as medications through it. However, yesterday after his 2PM feed his tube became dislodged at some point, as his caregiver/mother found at 9PM while changing him to prepare for bed. At that time they deflated the tube's cuff and attempted to replace it. However, they are uncertain if his tube is in good placement, and have subsequently not fed him or been able to give him his morning seizure medication, thus prompting them to bring him in to the ED. His family and caregiver deny any recent fevers, cough, cold symptoms, abnormal behavior, seizure activity, or other new symptoms of concern.    History is obtained from the patient's father at bedside. He states that he is at his baseline at this time. Seizures well controlled on Primidone and Depakote.     Pt sees Dr Jacoby Grimaldo outpatient pediatric neurologist whom I attempted to contact. Spoke to /answering service who explained that patient is being seen by Dr. Grimaldo.     PAST MEDICAL & SURGICAL HISTORY:  History of Scoliosis  Mental Retardation  CP (Cerebral Palsy)  RAD (Reactive Airway Disease)  Seizure Disorder  Hip Dislocation: s/p repair 1992, 1996  Tendon Contracture: ankles b/l  Status Post Unilateral Inguinal Hernia Repair: Undescended testes b/l  Status Post Myringotomy with Insertion of Tube: 1999x2, 1994x1,1995x1, 1996x1,1998 x2  Status Post Nissen Fundoplication (with Gastrostomy Tube Placement)  Status Post Nissen Fundoplication (with Gastrostomy Tube Placement)  Status Post Gastrostomy Tube (G Tube) Placement, Follow-Up Exam      Allergies    No Known Allergies    Intolerances        MEDICATIONS  (STANDING):  BACItracin   Ointment 1 Application(s) Topical two times a day  valproate sodium IVPB 250 milliGRAM(s) IV Intermittent every 8 hours  dextrose 5% + sodium chloride 0.9%. 1000 milliLiter(s) (100 mL/Hr) IV Continuous <Continuous>  heparin  Injectable 5000 Unit(s) SubCutaneous every 12 hours  pantoprazole  Injectable 40 milliGRAM(s) IV Push daily  cefepime  IVPB 2000 milliGRAM(s) IV Intermittent every 12 hours    MEDICATIONS  (PRN):  ALBUTerol/ipratropium for Nebulization 3 milliLiter(s) Nebulizer every 6 hours PRN Shortness of Breath and/or Wheezing      SOCIAL HISTORY: Denies tobacco/EtOH/drug use     FAMILY HISTORY:  No pertinent family history in first degree relatives      REVIEW OF SYSTEMS:  CONSTITUTIONAL:  No weight loss, fever, chills, weakness or fatigue.  HEENT:  Eyes:  No visual loss, blurred vision, double vision or yellow sclerae. Ears, Nose, Throat:  No hearing loss, sneezing, congestion, runny nose or sore throat.  SKIN:  No rash or itching.  CARDIOVASCULAR:  No chest pain, chest pressure or chest discomfort. No palpitations or edema.  RESPIRATORY:  No shortness of breath, cough or sputum.  GASTROINTESTINAL:  No anorexia, nausea, vomiting or diarrhea. No abdominal pain or blood.  GENITOURINARY:  denies incontinence/retention   NEUROLOGICAL:  see HPI  MUSCULOSKELETAL:  No muscle, back pain, joint pain or stiffness.  HEMATOLOGIC:  No anemia, bleeding or bruising.  LYMPHATICS:  No enlarged nodes. No history of splenectomy.  PSYCHIATRIC:  No history of depression or anxiety.  ENDOCRINOLOGIC:  No reports of sweating, cold or heat intolerance. No polyuria or polydipsia.      Vital Signs Last 24 Hrs  T(C): 37.1 (01 Aug 2017 15:00), Max: 37.1 (01 Aug 2017 15:00)  T(F): 98.8 (01 Aug 2017 15:00), Max: 98.8 (01 Aug 2017 15:00)  HR: 52 (01 Aug 2017 15:00) (52 - 103)  BP: 114/81 (01 Aug 2017 15:00) (97/67 - 117/79)  BP(mean): --  RR: 148 (01 Aug 2017 15:00) (17 - 148)  SpO2: 95% (01 Aug 2017 15:00) (95% - 100%)    PHYSICAL EXAM:   General appearance: No acute distress                 Mental Status: nonverbal,   Cranial Nerves: nystagmus b/l,PERRLA facial symmetric  Motor: moving all extrem x4 equally, increased tone/spastic x4 extrem  Sensation: unable to assess  Coordination: unable to assess  Reflexes: 3+ bilateral biceps, brachioradialis, patellar and ankle      LABS:                          15.5   14.1  )-----------( 321      ( 01 Aug 2017 10:47 )             47.0     08-01    137  |  99  |  9   ----------------------------<  100<H>  4.6   |  25  |  0.29<L>    Ca    9.5      01 Aug 2017 10:47    TPro  7.9  /  Alb  4.1  /  TBili  0.3  /  DBili  x   /  AST  26  /  ALT  20  /  AlkPhos  59  07-31      IMAGING: NEUROLOGY CONSULT     Patient is a 26y old male who presents with a chief complaint of evaluation of PEG tube placement, erythema around site.      HPI:  The patient is a 26 yr old male w/ cerebral palsy/ metal retardation had a peg tube placed when he was [age] years old. Since that time he has received his feeds as well as medications through it. However, yesterday after his 2PM feed his tube became dislodged at some point, as his caregiver/mother found at 9PM while changing him to prepare for bed. At that time they deflated the tube's cuff and attempted to replace it. However, they are uncertain if his tube is in good placement, and have subsequently not fed him or been able to give him his morning seizure medication, thus prompting them to bring him in to the ED. His family and caregiver deny any recent fevers, cough, cold symptoms, abnormal behavior, seizure activity, or other new symptoms of concern.    History is obtained from the patient's father at bedside. He states that he is at his baseline at this time. Seizures well controlled on Primidone and Depakote. When PEG was displaced, pt did not receive dose of seizure meds in the morning.    Pt sees Dr Jacoby Grimaldo outpatient pediatric neurologist whom I attempted to contact for a thorough history. Spoke to /answering service who explained that patient is being seen by Dr. Grimaldo and house neurology should not see patient, however no note seen in chart, and did not speak directly with physician.      PAST MEDICAL & SURGICAL HISTORY:  History of Scoliosis  Mental Retardation  CP (Cerebral Palsy)  RAD (Reactive Airway Disease)  Seizure Disorder  Hip Dislocation: s/p repair 1992, 1996  Tendon Contracture: ankles b/l  Status Post Unilateral Inguinal Hernia Repair: Undescended testes b/l  Status Post Myringotomy with Insertion of Tube: 1999x2, 1994x1,1995x1, 1996x1,1998 x2  Status Post Nissen Fundoplication (with Gastrostomy Tube Placement)  Status Post Nissen Fundoplication (with Gastrostomy Tube Placement)  Status Post Gastrostomy Tube (G Tube) Placement, Follow-Up Exam      Allergies    No Known Allergies    Intolerances        MEDICATIONS  (STANDING):  BACItracin   Ointment 1 Application(s) Topical two times a day  valproate sodium IVPB 250 milliGRAM(s) IV Intermittent every 8 hours  dextrose 5% + sodium chloride 0.9%. 1000 milliLiter(s) (100 mL/Hr) IV Continuous <Continuous>  heparin  Injectable 5000 Unit(s) SubCutaneous every 12 hours  pantoprazole  Injectable 40 milliGRAM(s) IV Push daily  cefepime  IVPB 2000 milliGRAM(s) IV Intermittent every 12 hours    MEDICATIONS  (PRN):  ALBUTerol/ipratropium for Nebulization 3 milliLiter(s) Nebulizer every 6 hours PRN Shortness of Breath and/or Wheezing      SOCIAL HISTORY: Denies tobacco/EtOH/drug use     FAMILY HISTORY:  No pertinent family history in first degree relatives      REVIEW OF SYSTEMS:  CONSTITUTIONAL:  No weight loss, fever, chills, weakness or fatigue.  HEENT:  Eyes:  No visual loss, blurred vision, double vision or yellow sclerae. Ears, Nose, Throat:  No hearing loss, sneezing, congestion, runny nose or sore throat.  SKIN:  No rash or itching.  CARDIOVASCULAR:  No chest pain, chest pressure or chest discomfort. No palpitations or edema.  RESPIRATORY:  No shortness of breath, cough or sputum.  GASTROINTESTINAL:  No anorexia, nausea, vomiting or diarrhea. No abdominal pain or blood.  GENITOURINARY:  denies incontinence/retention   NEUROLOGICAL:  see HPI  MUSCULOSKELETAL:  No muscle, back pain, joint pain or stiffness.  HEMATOLOGIC:  No anemia, bleeding or bruising.  LYMPHATICS:  No enlarged nodes. No history of splenectomy.  PSYCHIATRIC:  No history of depression or anxiety.  ENDOCRINOLOGIC:  No reports of sweating, cold or heat intolerance. No polyuria or polydipsia.      Vital Signs Last 24 Hrs  T(C): 37.1 (01 Aug 2017 15:00), Max: 37.1 (01 Aug 2017 15:00)  T(F): 98.8 (01 Aug 2017 15:00), Max: 98.8 (01 Aug 2017 15:00)  HR: 52 (01 Aug 2017 15:00) (52 - 103)  BP: 114/81 (01 Aug 2017 15:00) (97/67 - 117/79)  BP(mean): --  RR: 148 (01 Aug 2017 15:00) (17 - 148)  SpO2: 95% (01 Aug 2017 15:00) (95% - 100%)    PHYSICAL EXAM:   General appearance: No acute distress                 Mental Status: nonverbal, does not follow commands  Cranial Nerves: nystagmus b/l,PERRLA, face symmetric  Motor: moving all extrem x4 equally, increased tone/spastic x4 extrem  Sensation: unable to assess  Coordination: unable to assess  Reflexes: 3+ bilateral biceps, brachioradialis, patellar and ankle      LABS:                          15.5   14.1  )-----------( 321      ( 01 Aug 2017 10:47 )             47.0     08-01    137  |  99  |  9   ----------------------------<  100<H>  4.6   |  25  |  0.29<L>    Ca    9.5      01 Aug 2017 10:47    TPro  7.9  /  Alb  4.1  /  TBili  0.3  /  DBili  x   /  AST  26  /  ALT  20  /  AlkPhos  59  07-31      IMAGING:

## 2017-08-01 NOTE — CHART NOTE - NSCHARTNOTEFT_GEN_A_CORE
As per patient's mother, patient noted to have episode of seizure activity (eye blinking), which, she states, lasted for 50 seconds.  Upon exam, patient is back to baseline (as per mother).  Neurology, Dr. Cast, notified and states she will come to re-evaluate patient and advise regarding medications - awaiting recommendations.  Notified Dr. Miles, who agrees with plan to follow neurology recommendations.

## 2017-08-01 NOTE — CHART NOTE - NSCHARTNOTEFT_GEN_A_CORE
As per neurologist, Dr. Cast, patient ordered for extra loading dose of Depakote 750mg IV x 1 now (secondary to seizure activity) as well as primidone 100mg via PEG x 1 tonight (when IR confirms that it is okay to use - will give sign out to night NP to follow up.  Neurology to f/u in am regarding restarting primidone.

## 2017-08-01 NOTE — PROGRESS NOTE ADULT - SUBJECTIVE AND OBJECTIVE BOX
Interventional Radiology Pre-Procedure Note    This is a 26y Male with h/o CP, seizure d/o, MR presents to IR for G tube exchange. Pt mother at bedside and reports that feeding tube fell out at home, at which time she replaced it. g-tube in place, but IR requested to exchange as tube has not been exchanged since november. GI progress noted appreciated, spoke with Dr. Freed and requested that g-tube be exchanged no button PEG insertion. Plan is for g-tube exchange.       PAST MEDICAL & SURGICAL HISTORY:  History of Scoliosis  Mental Retardation  CP (Cerebral Palsy)  RAD (Reactive Airway Disease)  Seizure Disorder  Hip Dislocation: s/p repair 1992, 1996  Tendon Contracture: ankles b/l  Status Post Unilateral Inguinal Hernia Repair: Undescended testes b/l  Status Post Myringotomy with Insertion of Tube: 1999x2, 1994x1,1995x1, 1996x1,1998 x2  Status Post Nissen Fundoplication (with Gastrostomy Tube Placement)  Status Post Nissen Fundoplication (with Gastrostomy Tube Placement)  Status Post Gastrostomy Tube (G Tube) Placement, Follow-Up Exam       Vital Signs Last 24 Hrs  T(C): 37 (01 Aug 2017 16:27), Max: 37.1 (01 Aug 2017 15:00)  T(F): 98.6 (01 Aug 2017 16:27), Max: 98.8 (01 Aug 2017 15:00)  HR: 103 (01 Aug 2017 16:27) (52 - 103)  BP: 132/80 (01 Aug 2017 16:27) (97/67 - 132/80)  BP(mean): --  RR: 20 (01 Aug 2017 16:27) (17 - 148)  SpO2: 98% (01 Aug 2017 16:27) (95% - 100%)    Allergies: No Known Allergies    Physical Exam: Gen: NAD, awake  Abd: g-tube intact with disc flush against skin. ND NTTP, soft.     Labs:                         15.5   14.1  )-----------( 321      ( 01 Aug 2017 10:47 )             47.0     08-01    137  |  99  |  9   ----------------------------<  100<H>  4.6   |  25  |  0.29<L>    Ca    9.5      01 Aug 2017 10:47    TPro  7.9  /  Alb  4.1  /  TBili  0.3  /  DBili  x   /  AST  26  /  ALT  20  /  AlkPhos  59  07-31        Plan is for G-tube eval/ exchange. The full procedure/ risks/ benefits/ alternatives were discussed with pt mother and Informed consent obtained. All questions and concerns have been addressed at this time.

## 2017-08-01 NOTE — PROGRESS NOTE ADULT - SUBJECTIVE AND OBJECTIVE BOX
INTERVAL HPI/OVERNIGHT EVENTS:  stable from GI standpoint, replacement peg in place, discussed with father who is requesting it be changed to button PEG    MEDICATIONS  (STANDING):  BACItracin   Ointment 1 Application(s) Topical two times a day  valproate sodium IVPB 250 milliGRAM(s) IV Intermittent every 8 hours  dextrose 5% + sodium chloride 0.9%. 1000 milliLiter(s) (100 mL/Hr) IV Continuous <Continuous>  heparin  Injectable 5000 Unit(s) SubCutaneous every 12 hours  pantoprazole  Injectable 40 milliGRAM(s) IV Push daily  cefepime  IVPB 2000 milliGRAM(s) IV Intermittent every 12 hours    MEDICATIONS  (PRN):  ALBUTerol/ipratropium for Nebulization 3 milliLiter(s) Nebulizer every 6 hours PRN Shortness of Breath and/or Wheezing      Allergies    No Known Allergies    Intolerances            PHYSICAL EXAM:   Vital Signs:  Vital Signs Last 24 Hrs  T(C): 36.6 (01 Aug 2017 06:27), Max: 37.6 (2017 13:59)  T(F): 97.9 (01 Aug 2017 06:27), Max: 99.7 (2017 13:59)  HR: 103 (01 Aug 2017 06:27) (77 - 115)  BP: 97/67 (01 Aug 2017 06:27) (74/59 - 117/79)  BP(mean): --  RR: 17 (01 Aug 2017 06:27) (17 - 22)  SpO2: 98% (01 Aug 2017 06:27) (97% - 100%)  Daily Height in cm: 134.62 (2017 20:36)    Daily     GENERAL:  no distress  HEENT:  NC/AT,  anicteric  CHEST:   no increased effort, breath sounds clear  HEART:  Regular rhythm  ABDOMEN:  Soft, non-tender, non-distended, normoactive bowel sounds,  no masses ,no hepato-splenomegaly, no signs of chronic liver disease, peg site clean  EXTEREMITIES:  no cyanosis      LABS:                        16.3   19.5  )-----------( 360      ( 2017 13:02 )             48.3     07-    136  |  95<L>  |  14  ----------------------------<  99  4.6   |  26  |  0.39<L>    Ca    10.1      2017 13:02    TPro  7.9  /  Alb  4.1  /  TBili  0.3  /  DBili  x   /  AST  26  /  ALT  20  /  AlkPhos  59  07-31      Urinalysis Basic - ( 2017 13:56 )    Color: Yellow / Appearance: Clear / S.020 / pH: x  Gluc: x / Ketone: Negative  / Bili: Negative / Urobili: Negative   Blood: x / Protein: 30 mg/dL / Nitrite: Negative   Leuk Esterase: Negative / RBC: 0-2 /HPF / WBC 0-2 /HPF   Sq Epi: x / Non Sq Epi: x / Bacteria: x        RADIOLOGY & ADDITIONAL TESTS:

## 2017-08-01 NOTE — PROGRESS NOTE ADULT - SUBJECTIVE AND OBJECTIVE BOX
Follow-up Pulm Progress Note    Pt is awake and tracking  No coughing or difficulty breathing per father at bedside  Abd wall cellulitis much improved      Medications:  MEDICATIONS  (STANDING):  BACItracin   Ointment 1 Application(s) Topical two times a day  valproate sodium IVPB 250 milliGRAM(s) IV Intermittent every 8 hours  dextrose 5% + sodium chloride 0.9%. 1000 milliLiter(s) (100 mL/Hr) IV Continuous <Continuous>  heparin  Injectable 5000 Unit(s) SubCutaneous every 12 hours  pantoprazole  Injectable 40 milliGRAM(s) IV Push daily  cefepime  IVPB 2000 milliGRAM(s) IV Intermittent every 12 hours    MEDICATIONS  (PRN):  ALBUTerol/ipratropium for Nebulization 3 milliLiter(s) Nebulizer every 6 hours PRN Shortness of Breath and/or Wheezing          Vital Signs Last 24 Hrs  T(C): 36.6 (01 Aug 2017 06:27), Max: 37.6 (2017 13:59)  T(F): 97.9 (01 Aug 2017 06:27), Max: 99.7 (2017 13:59)  HR: 103 (01 Aug 2017 06:27) (77 - 115)  BP: 97/67 (01 Aug 2017 06:27) (74/59 - 117/79)  BP(mean): --  RR: 17 (01 Aug 2017 06:27) (17 - 22)  SpO2: 98% (01 Aug 2017 06:27) (97% - 100%) on RA      VBG pH 7.38  @ 13:02    VBG pCO2 53  @ 13:02    VBG O2 sat 66  @ 13:02    VBG lactate 1.5  @ 13:02       @ 07:01  -   @ 07:00  --------------------------------------------------------  IN: 50 mL / OUT: 0 mL / NET: 50 mL          LABS:                        16.3   19.5  )-----------( 360      ( 2017 13:02 )             48.3         136  |  95<L>  |  14  ----------------------------<  99  4.6   |  26  |  0.39<L>    Ca    10.1      2017 13:02    TPro  7.9  /  Alb  4.1  /  TBili  0.3  /  DBili  x   /  AST  26  /  ALT  20  /  AlkPhos  59  07-31          CAPILLARY BLOOD GLUCOSE          Urinalysis Basic - ( 2017 13:56 )    Color: Yellow / Appearance: Clear / S.020 / pH: x  Gluc: x / Ketone: Negative  / Bili: Negative / Urobili: Negative   Blood: x / Protein: 30 mg/dL / Nitrite: Negative   Leuk Esterase: Negative / RBC: 0-2 /HPF / WBC 0-2 /HPF   Sq Epi: x / Non Sq Epi: x / Bacteria: x      Procalcitonin, Serum: <0.05 ng/mL (2017 13:07)    Physical Examination:  PULM: Clear to auscultation bilaterally, no significant sputum production  CVS: S1, S2 RRR tachy    RADIOLOGY REVIEWED  CXR: grossly clear

## 2017-08-01 NOTE — CONSULT NOTE ADULT - ASSESSMENT
27 YO M with cerebral palsy, mental retardation, poor cognitive baseline, and seizure DO well maintained on Depakote 250mg q8h and Primidone 100mg BID who is admitted for PEG tube adjustment with site erythema, r/o cellulitis. Pt doing well and at baseline per family, currently only receiving Depakote IV.   Pt sees Dr Jacoby Grimaldo outpatient pediatric neurologist whom I attempted to contact for a thorough history. Spoke to /answering service who explained that patient is being seen by Dr. Grimaldo and house neurology should not see patient, however no note seen in chart, and not confirmed by primary team.

## 2017-08-01 NOTE — PROGRESS NOTE ADULT - ASSESSMENT
pt w/ cerebral palsy/ mental retardation  peg dislodgement  ir eval today  eval for placement   npo   iv fluids  gi  f/u      iv vpa  neuro eval  dvt proph  discussed w/ father at bedside  id  f/u  abd cellulitis better  ? abd cellulitis  abs as per id

## 2017-08-01 NOTE — CONSULT NOTE ADULT - ATTENDING COMMENTS
Patient seen and examined with Dr. Munson. I agree with her history, exam findings and plans as noted above. Patient with developmental delay and dislodged PEG tube that was reinserted by his other with minimal cellulitis around the entrance site. Receiving Cefepime for cellulits    will follow
Agree with above.   Doubt PNA  Abx per ID
25 YO M with cerebral palsy, mental retardation, poor cognitive baseline, and seizure DO well maintained on Depakote 250mg q8h and Primidone 100mg BID who is admitted for PEG tube adjustment with site erythema, r/o cellulitis. Pt doing well and at baseline per family, currently only receiving Depakote IV. currently seizure free and doing well on depakote  . recc:optimize depakote and follow up LFTs,ammonia and depakote levels.  will follow up

## 2017-08-01 NOTE — PROGRESS NOTE ADULT - SUBJECTIVE AND OBJECTIVE BOX
NEUROLOGY FOLLOW UP    Pt is a 25YO male with cerebral palsy s/p PEG, mental retardation and seizure DO admitted for evaluation of PEG site with replacement planned by IR 8/1 evening.    Subjective:   Received call from NP that pt had what family calls his typical seizure episode consisting of b/l eye blinking lasting about 1 minute with complete return to baseline.         MEDICATIONS  (STANDING):  BACItracin   Ointment 1 Application(s) Topical two times a day  valproate sodium IVPB 250 milliGRAM(s) IV Intermittent every 8 hours  dextrose 5% + sodium chloride 0.9%. 1000 milliLiter(s) (100 mL/Hr) IV Continuous <Continuous>  heparin  Injectable 5000 Unit(s) SubCutaneous every 12 hours  pantoprazole  Injectable 40 milliGRAM(s) IV Push daily  cefepime  IVPB 2000 milliGRAM(s) IV Intermittent every 12 hours  valproate sodium IVPB 750 milliGRAM(s) IV Intermittent once    MEDICATIONS  (PRN):  ALBUTerol/ipratropium for Nebulization 3 milliLiter(s) Nebulizer every 6 hours PRN Shortness of Breath and/or Wheezing    Vital Signs Last 24 Hrs  T(C): 37 (01 Aug 2017 16:27), Max: 37.1 (01 Aug 2017 15:00)  T(F): 98.6 (01 Aug 2017 16:27), Max: 98.8 (01 Aug 2017 15:00)  HR: 103 (01 Aug 2017 16:27) (52 - 103)  BP: 132/80 (01 Aug 2017 16:27) (97/67 - 132/80)  BP(mean): --  RR: 20 (01 Aug 2017 16:27) (17 - 148)  SpO2: 98% (01 Aug 2017 16:27) (95% - 100%)      PHYSICAL EXAM:   General appearance: No acute distress                 Mental Status: nonverbal, does not follow commands  Cranial Nerves: nystagmus b/l,PERRLA, face symmetric  Motor: moving all extrem x4 equally, increased tone/spastic x4 extrem  Sensation: unable to assess  Coordination: unable to assess  Reflexes: 2+bilateral biceps, brachioradialis, patellar and ankle      LABS:                          15.5   14.1  )-----------( 321      ( 01 Aug 2017 10:47 )             47.0     08-01    137  |  99  |  9   ----------------------------<  100<H>  4.6   |  25  |  0.29<L>    Ca    9.5      01 Aug 2017 10:47    TPro  7.9  /  Alb  4.1  /  TBili  0.3  /  DBili  x   /  AST  26  /  ALT  20  /  AlkPhos  59  07-31      IMAGING:

## 2017-08-01 NOTE — PROGRESS NOTE ADULT - SUBJECTIVE AND OBJECTIVE BOX
CHIEF COMPLAINT:Patient is a 26y old  Male who presents with a chief complaint of Cellulitis of abdominal wall (31 Jul 2017 23:15)    	no new complaints     PAST MEDICAL & SURGICAL HISTORY:  History of Scoliosis  Mental Retardation  CP (Cerebral Palsy)  RAD (Reactive Airway Disease)  Seizure Disorder  Hip Dislocation: s/p repair 1992, 1996  Tendon Contracture: ankles b/l  Status Post Unilateral Inguinal Hernia Repair: Undescended testes b/l  Status Post Myringotomy with Insertion of Tube: 1999x2, 1994x1,1995x1, 1996x1,1998 x2  Status Post Nissen Fundoplication (with Gastrostomy Tube Placement)  Status Post Nissen Fundoplication (with Gastrostomy Tube Placement)  Status Post Gastrostomy Tube (G Tube) Placement, Follow-Up Exam          REVIEW OF SYSTEMS:  CONSTITUTIONAL: No fever, weight loss, or fatigue  EYES: No eye pain, visual disturbances, or discharge  NECK: No pain or stiffness  RESPIRATORY: No cough, wheezing, chills or hemoptysis; No Shortness of Breath  CARDIOVASCULAR: No chest pain, palpitations, passing out, dizziness, or leg swelling  GASTROINTESTINAL: No abdominal or epigastric pain. No nausea, vomiting, or hematemesis; No diarrhea or constipation. No melena or hematochezia.  GENITOURINARY: No dysuria, frequency, hematuria, or incontinence  NEUROLOGICALsevere mental retardation  SKIN: No itching, burning, rashes, or lesions   LYMPH Nodes: No enlarged glands  ENDOCRINE: No heat or cold intolerance; No hair loss  MUSCULOSKELETAL: No joint pain or swelling; No muscle, back, or extremity pain    Medications:  MEDICATIONS  (STANDING):  BACItracin   Ointment 1 Application(s) Topical two times a day  valproate sodium IVPB 250 milliGRAM(s) IV Intermittent every 8 hours  dextrose 5% + sodium chloride 0.9%. 1000 milliLiter(s) (100 mL/Hr) IV Continuous <Continuous>  heparin  Injectable 5000 Unit(s) SubCutaneous every 12 hours  pantoprazole  Injectable 40 milliGRAM(s) IV Push daily  cefepime  IVPB 2000 milliGRAM(s) IV Intermittent every 12 hours    MEDICATIONS  (PRN):  ALBUTerol/ipratropium for Nebulization 3 milliLiter(s) Nebulizer every 6 hours PRN Shortness of Breath and/or Wheezing    	    PHYSICAL EXAM:  T(C): 36.6 (08-01-17 @ 06:27), Max: 37.6 (07-31-17 @ 13:59)  HR: 103 (08-01-17 @ 06:27) (77 - 115)  BP: 97/67 (08-01-17 @ 06:27) (74/59 - 117/79)  RR: 17 (08-01-17 @ 06:27) (17 - 22)  SpO2: 98% (08-01-17 @ 06:27) (97% - 100%)  Wt(kg): --  I&O's Summary    31 Jul 2017 07:01  -  01 Aug 2017 07:00  --------------------------------------------------------  IN: 50 mL / OUT: 0 mL / NET: 50 mL        Appearance: Normal	  HEENT:   Normal oral mucosa, PERRL, EOMI	  Lymphatic: No lymphadenopathy  Cardiovascular: Normal S1 S2, No JVD, No murmurs, No edema  Respiratory: Lungs clear to auscultation	  Gastrointestinal:  Soft, Non-tender, + BS	tube in place less redness  Skin: No rashes, No ecchymoses, No cyanosis	  Neurologic: Non-focal  Extremities:contracted  Vascular: Peripheral pulses palpable 2+ bilaterally    TELEMETRY: 	    ECG:  	  RADIOLOGY:  OTHER: 	  	  LABS:	 	    CARDIAC MARKERS:                                16.3   19.5  )-----------( 360      ( 31 Jul 2017 13:02 )             48.3     07-31    136  |  95<L>  |  14  ----------------------------<  99  4.6   |  26  |  0.39<L>    Ca    10.1      31 Jul 2017 13:02    TPro  7.9  /  Alb  4.1  /  TBili  0.3  /  DBili  x   /  AST  26  /  ALT  20  /  AlkPhos  59  07-31    proBNP:   Lipid Profile:   HgA1c:   TSH:

## 2017-08-02 ENCOUNTER — TRANSCRIPTION ENCOUNTER (OUTPATIENT)
Age: 27
End: 2017-08-02

## 2017-08-02 LAB
ALBUMIN SERPL ELPH-MCNC: 3.6 G/DL — SIGNIFICANT CHANGE UP (ref 3.3–5)
ALP SERPL-CCNC: 56 U/L — SIGNIFICANT CHANGE UP (ref 40–120)
ALT FLD-CCNC: 13 U/L — SIGNIFICANT CHANGE UP (ref 10–45)
AMMONIA BLD-MCNC: 65 UMOL/L — HIGH (ref 11–55)
ANION GAP SERPL CALC-SCNC: 13 MMOL/L — SIGNIFICANT CHANGE UP (ref 5–17)
AST SERPL-CCNC: 26 U/L — SIGNIFICANT CHANGE UP (ref 10–40)
BILIRUB DIRECT SERPL-MCNC: 0.2 MG/DL — SIGNIFICANT CHANGE UP (ref 0–0.2)
BILIRUB INDIRECT FLD-MCNC: 0.2 MG/DL — SIGNIFICANT CHANGE UP (ref 0.2–1)
BILIRUB SERPL-MCNC: 0.4 MG/DL — SIGNIFICANT CHANGE UP (ref 0.2–1.2)
BUN SERPL-MCNC: 6 MG/DL — LOW (ref 7–23)
CALCIUM SERPL-MCNC: 9 MG/DL — SIGNIFICANT CHANGE UP (ref 8.4–10.5)
CHLORIDE SERPL-SCNC: 95 MMOL/L — LOW (ref 96–108)
CO2 SERPL-SCNC: 26 MMOL/L — SIGNIFICANT CHANGE UP (ref 22–31)
CREAT SERPL-MCNC: 0.4 MG/DL — LOW (ref 0.5–1.3)
GLUCOSE SERPL-MCNC: 95 MG/DL — SIGNIFICANT CHANGE UP (ref 70–99)
HCT VFR BLD CALC: 43.6 % — SIGNIFICANT CHANGE UP (ref 39–50)
HGB BLD-MCNC: 14.3 G/DL — SIGNIFICANT CHANGE UP (ref 13–17)
MCHC RBC-ENTMCNC: 30 PG — SIGNIFICANT CHANGE UP (ref 27–34)
MCHC RBC-ENTMCNC: 32.8 GM/DL — SIGNIFICANT CHANGE UP (ref 32–36)
MCV RBC AUTO: 91.4 FL — SIGNIFICANT CHANGE UP (ref 80–100)
PLATELET # BLD AUTO: 310 K/UL — SIGNIFICANT CHANGE UP (ref 150–400)
POTASSIUM SERPL-MCNC: 4.3 MMOL/L — SIGNIFICANT CHANGE UP (ref 3.5–5.3)
POTASSIUM SERPL-SCNC: 4.3 MMOL/L — SIGNIFICANT CHANGE UP (ref 3.5–5.3)
PROT SERPL-MCNC: 7 G/DL — SIGNIFICANT CHANGE UP (ref 6–8.3)
RBC # BLD: 4.77 M/UL — SIGNIFICANT CHANGE UP (ref 4.2–5.8)
RBC # FLD: 13.4 % — SIGNIFICANT CHANGE UP (ref 10.3–14.5)
SODIUM SERPL-SCNC: 134 MMOL/L — LOW (ref 135–145)
VALPROATE SERPL-MCNC: 92 UG/ML — SIGNIFICANT CHANGE UP (ref 50–100)
WBC # BLD: 12.72 K/UL — HIGH (ref 3.8–10.5)
WBC # FLD AUTO: 12.72 K/UL — HIGH (ref 3.8–10.5)

## 2017-08-02 PROCEDURE — 74177 CT ABD & PELVIS W/CONTRAST: CPT | Mod: 26

## 2017-08-02 PROCEDURE — 99232 SBSQ HOSP IP/OBS MODERATE 35: CPT

## 2017-08-02 RX ORDER — PRIMIDONE 250 MG/1
2 TABLET ORAL
Qty: 0 | Refills: 0 | COMMUNITY

## 2017-08-02 RX ORDER — DIVALPROEX SODIUM 500 MG/1
250 TABLET, DELAYED RELEASE ORAL THREE TIMES A DAY
Qty: 0 | Refills: 0 | Status: DISCONTINUED | OUTPATIENT
Start: 2017-08-02 | End: 2017-08-02

## 2017-08-02 RX ORDER — VALPROIC ACID (AS SODIUM SALT) 250 MG/5ML
250 SOLUTION, ORAL ORAL THREE TIMES A DAY
Qty: 0 | Refills: 0 | Status: DISCONTINUED | OUTPATIENT
Start: 2017-08-02 | End: 2017-08-03

## 2017-08-02 RX ORDER — PRIMIDONE 250 MG/1
100 TABLET ORAL
Qty: 0 | Refills: 0 | Status: DISCONTINUED | OUTPATIENT
Start: 2017-08-02 | End: 2017-08-03

## 2017-08-02 RX ORDER — PRIMIDONE 250 MG/1
100 TABLET ORAL DAILY
Qty: 0 | Refills: 0 | Status: DISCONTINUED | OUTPATIENT
Start: 2017-08-02 | End: 2017-08-02

## 2017-08-02 RX ADMIN — Medication 1 APPLICATION(S): at 05:20

## 2017-08-02 RX ADMIN — Medication 250 MILLIGRAM(S): at 23:48

## 2017-08-02 RX ADMIN — Medication 1 APPLICATION(S): at 17:27

## 2017-08-02 RX ADMIN — PANTOPRAZOLE SODIUM 40 MILLIGRAM(S): 20 TABLET, DELAYED RELEASE ORAL at 11:49

## 2017-08-02 RX ADMIN — SODIUM CHLORIDE 100 MILLILITER(S): 9 INJECTION, SOLUTION INTRAVENOUS at 19:00

## 2017-08-02 RX ADMIN — CEFEPIME 100 MILLIGRAM(S): 1 INJECTION, POWDER, FOR SOLUTION INTRAMUSCULAR; INTRAVENOUS at 09:25

## 2017-08-02 RX ADMIN — HEPARIN SODIUM 5000 UNIT(S): 5000 INJECTION INTRAVENOUS; SUBCUTANEOUS at 05:20

## 2017-08-02 RX ADMIN — CEFEPIME 100 MILLIGRAM(S): 1 INJECTION, POWDER, FOR SOLUTION INTRAMUSCULAR; INTRAVENOUS at 23:47

## 2017-08-02 RX ADMIN — HEPARIN SODIUM 5000 UNIT(S): 5000 INJECTION INTRAVENOUS; SUBCUTANEOUS at 17:27

## 2017-08-02 RX ADMIN — PRIMIDONE 100 MILLIGRAM(S): 250 TABLET ORAL at 17:27

## 2017-08-02 RX ADMIN — Medication 26.25 MILLIGRAM(S): at 05:20

## 2017-08-02 RX ADMIN — Medication 26.25 MILLIGRAM(S): at 12:07

## 2017-08-02 NOTE — PROGRESS NOTE ADULT - ASSESSMENT
pt w/ cerebral palsy/ mental retardation  peg dislodgement reinserted new tube  feeds  leukocytosis slowly improving  iv fluids prn  gi  f/u  seizures/ neuro f/u noted  cont antiseizure meds  d/c home if ct a/p neg and cleared by id and neuro     iv vpa  neuro eval  dvt proph  discussed w/ father at bedside  id  f/u  abd cellulitis better  ? abd cellulitis  abs as per id

## 2017-08-02 NOTE — PROGRESS NOTE ADULT - ASSESSMENT
27 yo man with sever developmental delay requiring total care, admitted with dehydration and erythema around a dislodged PEG feeding tube.  Receiving Cefepime with improvement in the localized area  PEG tube replaced  Can discontinue antibiotics

## 2017-08-02 NOTE — DISCHARGE NOTE ADULT - PLAN OF CARE
Functioning PEG bacitracin to PEG site  Keep site clean and dry at all times no need for continue antibiotics Continue singulair continue current medications  Follow up with PMD Assistance with daily activities Supervision and assistance with daily activities Weekly enema as before

## 2017-08-02 NOTE — PROGRESS NOTE ADULT - SUBJECTIVE AND OBJECTIVE BOX
INFECTIOUS DISEASES FOLLOW UP-- Kari Smith  [phone]    This is a follow up note for this  26yMale with  Cellulitis      ROS:  CONSTITUTIONAL:  No fever, appears better than upon admission  babbles but cannot answer questions  father at the bedside    Allergies    No Known Allergies    Intolerances        ANTIBIOTICS/RELEVANT:  antimicrobials  cefepime  IVPB 2000 milliGRAM(s) IV Intermittent every 12 hours    immunologic:    OTHER:  BACItracin   Ointment 1 Application(s) Topical two times a day  dextrose 5% + sodium chloride 0.9%. 1000 milliLiter(s) IV Continuous <Continuous>  heparin  Injectable 5000 Unit(s) SubCutaneous every 12 hours  pantoprazole  Injectable 40 milliGRAM(s) IV Push daily  ALBUTerol/ipratropium for Nebulization 3 milliLiter(s) Nebulizer every 6 hours PRN  primidone 100 milliGRAM(s) Oral two times a day  valproic  acid Syrup 250 milliGRAM(s) Oral three times a day      Objective:  Vital Signs Last 24 Hrs  T(C): 36.8 (02 Aug 2017 14:16), Max: 36.8 (02 Aug 2017 14:16)  T(F): 98.3 (02 Aug 2017 14:16), Max: 98.3 (02 Aug 2017 14:16)  HR: 100 (02 Aug 2017 14:16) (82 - 100)  BP: 137/85 (02 Aug 2017 14:16) (98/65 - 137/85)  BP(mean): --  RR: 17 (02 Aug 2017 14:16) (17 - 18)  SpO2: 94% (02 Aug 2017 14:16) (94% - 100%)    PHYSICAL EXAM:  Constitutional:no acute distress  Eyes:BLANCA, EOMI  Ear/Nose/Throat: no oral lesions, 	  Respiratory: clear BL  Cardiovascular: S1S2  Gastrointestinal:soft, (+) BS, no tenderness, PEG tube site without drainage or erythema  Extremities:no e/e/c  No Lymphadenopathy  IV sites not inflammed.    LABS:                        14.3   12.72 )-----------( 310      ( 02 Aug 2017 07:26 )             43.6     08-02    134<L>  |  95<L>  |  6<L>  ----------------------------<  95  4.3   |  26  |  0.40<L>    Ca    9.0      02 Aug 2017 07:33    TPro  7.0  /  Alb  3.6  /  TBili  0.4  /  DBili  0.2  /  AST  26  /  ALT  13  /  AlkPhos  56  08-02          MICROBIOLOGY:    Culture - Blood (07.31.17 @ 16:27)    Specimen Source: .Blood Blood    Culture Results:   No growth to date.            Culture - Urine (07.31.17 @ 16:35)    Specimen Source: .Urine Catheterized    Culture Results:   No growth    RECENT CULTURES:      RADIOLOGY & ADDITIONAL STUDIES:  < from: IR Procedure (08.01.17 @ 19:31) >  Impression: Guidewire exchange with insertion of an 18 [place] gastrostomy   as described above.    < end of copied text >

## 2017-08-02 NOTE — DISCHARGE NOTE ADULT - MEDICATION SUMMARY - MEDICATIONS TO STOP TAKING
I will STOP taking the medications listed below when I get home from the hospital:    Augmentin 875 mg-125 mg oral tablet  -- 1 tab(s) by mouth every 12 hours x 2 days

## 2017-08-02 NOTE — DISCHARGE NOTE ADULT - SECONDARY DIAGNOSIS.
Cellulitis of abdominal wall RAD (reactive airway disease) Seizure Disorder CP (cerebral palsy) Mental retardation Constipation

## 2017-08-02 NOTE — DISCHARGE NOTE ADULT - PATIENT PORTAL LINK FT
“You can access the Follow[facility] Patient Portal, offered by [facility], by registering with the following website: [email or web address]”

## 2017-08-02 NOTE — PROGRESS NOTE ADULT - ASSESSMENT
27 y/o M with PMH of CP, MR, seizure disorder, severe restrictive lung disease 2nd scoliosis and thoracic cage deformity, PEG tube placement with history of Nissen fundoplication presents after PEG became dislodged at home yesterday and PEG site was noted to be erythematous concerning for cellulitis. In ER he was hypotensive (74/59) and leukocytosis. Asked to eval for r/o PNA

## 2017-08-02 NOTE — PROGRESS NOTE ADULT - SUBJECTIVE AND OBJECTIVE BOX
NEUROLOGY FOLLOW UP    26y old male who presents with a chief complaint of evaluation of PEG tube placement, erythema around site, with h/x seizure DO s/p missed doses of AEDs and one episode of petit mal sz yesterday.    Pt seen and examined at bedside. Mother of pt at bedside, described episode of eye blinking b/l lasting 50sec, back to baseline and has been doing well since yesterday. No new changes overnight. No further episodes.  PEG tube functioning and getting AEDs via PEG now. Received Primidone 100mg this am and VPA 250mg.     Plan per primary team is for CT Abdomen.       MEDICATIONS  (STANDING):  BACItracin   Ointment 1 Application(s) Topical two times a day  valproate sodium IVPB 250 milliGRAM(s) IV Intermittent every 8 hours  dextrose 5% + sodium chloride 0.9%. 1000 milliLiter(s) (100 mL/Hr) IV Continuous <Continuous>  heparin  Injectable 5000 Unit(s) SubCutaneous every 12 hours  pantoprazole  Injectable 40 milliGRAM(s) IV Push daily  cefepime  IVPB 2000 milliGRAM(s) IV Intermittent every 12 hours    MEDICATIONS  (PRN):  ALBUTerol/ipratropium for Nebulization 3 milliLiter(s) Nebulizer every 6 hours PRN Shortness of Breath and/or Wheezing      Vital Signs Last 24 Hrs  T(C): 36.7 (02 Aug 2017 04:52), Max: 37.1 (01 Aug 2017 15:00)  T(F): 98 (02 Aug 2017 04:52), Max: 98.8 (01 Aug 2017 15:00)  HR: 88 (02 Aug 2017 04:52) (52 - 103)  BP: 98/65 (02 Aug 2017 04:52) (98/65 - 132/80)  BP(mean): --  RR: 17 (02 Aug 2017 04:52) (17 - 148)  SpO2: 100% (02 Aug 2017 04:52) (94% - 100%)      PHYSICAL EXAM:   General appearance: No acute distress                 Mental Status: nonverbal, does not follow commands  Cranial Nerves: nystagmus b/l,PERRLA, face symmetric  Motor: moving all extrem x4 equally, increased tone/spastic x4 extrem  Sensation: unable to assess  Coordination: unable to assess  Reflexes: 3+ bilateral biceps, brachioradialis, patellar and ankle      LABS:                          14.3   12.72 )-----------( 310      ( 02 Aug 2017 07:26 )             43.6   08-02    134<L>  |  95<L>  |  6<L>  ----------------------------<  95  4.3   |  26  |  0.40<L>    Ca    9.0      02 Aug 2017 07:33    TPro  7.0  /  Alb  3.6  /  TBili  0.4  /  DBili  0.2  /  AST  26  /  ALT  13  /  AlkPhos  56  08-02      IMAGING:

## 2017-08-02 NOTE — DIETITIAN INITIAL EVALUATION ADULT. - OTHER INFO
Nutrition consult received for enteral feeding. Mother at bedside reports wt has been stable with 'couple of pounds' fluctuations. Per previous RD note, pt wt was 108 pounds, current dosing wt is 104 pounds. Pt is NPO s/p PEG replacement. Pt has been on Jevity 1.5 x 5 cans a day at home, recently switched to Isosource 1.5 as Jevity 1.5 became difficult to find per mother. Feed times at home are 7am, 1pm, 4pm, 7pm and 10pm. NKFA. No GI distress, +BM 2 days ago.

## 2017-08-02 NOTE — PROGRESS NOTE ADULT - ASSESSMENT
25 YO M with cerebral palsy, mental retardation, and seizure DO well maintained on Depakote 250mg q8h and Primidone 100mg BID at home who is admitted for PEG tube replacement, possible cellulitis of site. 1 episode of petit mal seizure in the setting of missed doses of AED.   Pt was loaded with VPA 750mg after event. No further events. Pt is at baseline. PEG site now functioning, please continue home meds via PEG. Follow up VPA levels.

## 2017-08-02 NOTE — DISCHARGE NOTE ADULT - HOSPITAL COURSE
Attending to slade 26 yr old male w/ cerebral palsy/ metal retardation  had a peg tube placed when he was [age] years old. Since that time he has received his feeds as well as medications through it. However, yesterday after his 2PM feed his tube became dislodged at some point, as his caregiver/mother found at 9PM while changing him to prepare for bed. At that time they deflated the tube's cuff and attempted to replace it. However, they are uncertain if his tube is in good placement, and have subsequently not fed him or been able to give him his morning seizure medication, thus prompting them to bring him in to the ED.  Dx: PEG dislodgement, abd cellulitis  pt w/ cerebral palsy/ mental retardation  peg dislodgement reinserted new tube  feeds  leukocytosis improved  gi  f/u noted  seizures/ neuro f/u noted  cont antiseizure meds  d/c home if ct a/p neg and cleared by id and neuro   ct clear   d/c w/ outpt f/u    neuro eval noted  cont meds  discussed w/ father at bedside  id  f/u noted     abd cellulitis improved / abs d/c as per id   ct a/p neg  d/c planning

## 2017-08-02 NOTE — DIETITIAN INITIAL EVALUATION ADULT. - ENERGY NEEDS
Height: 53 inches, Weight: 104 pounds  BMI: 21 kg/m2 IBW: 86 pounds (+/-10%), %IBW:121%  Pertinent Info: Per chart, 25 y/o male with cerebral palsy with mental retardation, seizure disorder admtited with PEG dislodgement, r/o PNA and cellulitis of abdominal wall. No edema, no pressure ulcers noted at this time.

## 2017-08-02 NOTE — DISCHARGE NOTE ADULT - CARE PROVIDER_API CALL
Jacoby Grimaldo), Child Neurology; Pediatrics  [address] [place] [address]  Phone: [phone]  Fax: [phone]

## 2017-08-02 NOTE — PROGRESS NOTE ADULT - SUBJECTIVE AND OBJECTIVE BOX
Follow-up Pulm Progress Note    Awake, tracking  no sputum or coughing  no s/s resp distress    Medications:  MEDICATIONS  (STANDING):  BACItracin   Ointment 1 Application(s) Topical two times a day  valproate sodium IVPB 250 milliGRAM(s) IV Intermittent every 8 hours  dextrose 5% + sodium chloride 0.9%. 1000 milliLiter(s) (100 mL/Hr) IV Continuous <Continuous>  heparin  Injectable 5000 Unit(s) SubCutaneous every 12 hours  pantoprazole  Injectable 40 milliGRAM(s) IV Push daily  cefepime  IVPB 2000 milliGRAM(s) IV Intermittent every 12 hours  primidone 100 milliGRAM(s) Oral two times a day    MEDICATIONS  (PRN):  ALBUTerol/ipratropium for Nebulization 3 milliLiter(s) Nebulizer every 6 hours PRN Shortness of Breath and/or Wheezing          Vital Signs Last 24 Hrs  T(C): 36.7 (02 Aug 2017 04:52), Max: 37.1 (01 Aug 2017 15:00)  T(F): 98 (02 Aug 2017 04:52), Max: 98.8 (01 Aug 2017 15:00)  HR: 88 (02 Aug 2017 04:52) (52 - 103)  BP: 98/65 (02 Aug 2017 04:52) (98/65 - 132/80)  BP(mean): --  RR: 17 (02 Aug 2017 04:52) (17 - 148)  SpO2: 100% (02 Aug 2017 04:52) (94% - 100%)      VBG pH 7.38  @ 13:02    VBG pCO2 53  @ 13:02    VBG O2 sat 66  @ 13:02    VBG lactate 1.5 0731 @ 13:02      08 @ 07:01  -  0802 @ 07:00  --------------------------------------------------------  IN: 750 mL / OUT: 0 mL / NET: 750 mL          LABS:                        14.3   12.72 )-----------( 310      ( 02 Aug 2017 07:26 )             43.6     0802    134<L>  |  95<L>  |  6<L>  ----------------------------<  95  4.3   |  26  |  0.40<L>    Ca    9.0      02 Aug 2017 07:33    TPro  7.0  /  Alb  3.6  /  TBili  0.4  /  DBili  0.2  /  AST  26  /  ALT  13  /  AlkPhos  56  08-02          CAPILLARY BLOOD GLUCOSE          Urinalysis Basic - ( 2017 13:56 )    Color: Yellow / Appearance: Clear / S.020 / pH: x  Gluc: x / Ketone: Negative  / Bili: Negative / Urobili: Negative   Blood: x / Protein: 30 mg/dL / Nitrite: Negative   Leuk Esterase: Negative / RBC: 0-2 /HPF / WBC 0-2 /HPF   Sq Epi: x / Non Sq Epi: x / Bacteria: x      Procalcitonin, Serum: <0.05 ng/mL (2017 13:07)                  CULTURES: (if applicable)          Physical Examination:  PULM: Clear to auscultation bilaterally, no significant sputum production  CVS: S1, S2 RRR tachy  RADIOLOGY REVIEWED  CXR: grossly clear

## 2017-08-02 NOTE — DISCHARGE NOTE ADULT - CARE PLAN
Principal Discharge DX:	Dislodged gastrostomy tube  Goal:	Functioning PEG  Instructions for follow-up, activity and diet:	bacitracin to PEG site  Keep site clean and dry at all times  Secondary Diagnosis:	Cellulitis of abdominal wall  Instructions for follow-up, activity and diet:	no need for continue antibiotics  Secondary Diagnosis:	RAD (reactive airway disease)  Instructions for follow-up, activity and diet:	Continue singulair  Secondary Diagnosis:	Seizure Disorder  Instructions for follow-up, activity and diet:	continue current medications  Follow up with PMD  Secondary Diagnosis:	CP (cerebral palsy)  Instructions for follow-up, activity and diet:	Assistance with daily activities  Secondary Diagnosis:	Mental retardation  Instructions for follow-up, activity and diet:	Supervision and assistance with daily activities  Secondary Diagnosis:	Constipation  Instructions for follow-up, activity and diet:	Weekly enema as before

## 2017-08-02 NOTE — DISCHARGE NOTE ADULT - MEDICATION SUMMARY - MEDICATIONS TO TAKE
I will START or STAY ON the medications listed below when I get home from the hospital:    Isosource 1.5 danna/ml  -- 120 milliliter(s) by gastrostomy tube 5 times a day  -- Indication: For feeding    primidone 50 mg oral tablet  -- 2 tab(s) by gastrostomy tube 2 times a day  -- Indication: For Seizure Disorder    valproic acid 250 mg/5 mL oral syrup  -- 8 milliliter(s) by gastrostomy tube 3 times a day  -- Indication: For Seizure Disorder    bacitracin zinc 500 units/g topical ointment  -- Apply on skin to affected area , As Needed  -- Indication: For Peg site redness    ranitidine 15 mg/mL oral syrup  -- 4 milliliter(s) by gastrostomy tube 3 times a day  -- Indication: For gerd    montelukast 10 mg oral tablet  -- 1 tab(s) by gastrostomy tube once a day  -- Indication: For allergy    Centamin oral liquid  -- 10 milliliter(s) by gastrostomy tube once a day  -- Indication: For Supplement

## 2017-08-02 NOTE — DIETITIAN INITIAL EVALUATION ADULT. - NS AS NUTRI INTERV ENTERAL NUTRITION
Recommend to continue home enteral feed regimen of Jevity 1.5 237cc bolus 5 x day (7am, 1pm, 4pm, 7pm, 10pm) to provide 1777kcal and 75g protein (37.3kcal/kg and 1.58g/kg based on dosing wt of 47.6kg)

## 2017-08-02 NOTE — PROGRESS NOTE ADULT - SUBJECTIVE AND OBJECTIVE BOX
CHIEF COMPLAINT:Patient stable  no new changes    	        PAST MEDICAL & SURGICAL HISTORY:  History of Scoliosis  Mental Retardation  CP (Cerebral Palsy)  RAD (Reactive Airway Disease)  Seizure Disorder  Hip Dislocation: s/p repair 1992, 1996  Tendon Contracture: ankles b/l  Status Post Unilateral Inguinal Hernia Repair: Undescended testes b/l  Status Post Myringotomy with Insertion of Tube: 1999x2, 1994x1,1995x1, 1996x1,1998 x2  Status Post Nissen Fundoplication (with Gastrostomy Tube Placement)  Status Post Nissen Fundoplication (with Gastrostomy Tube Placement)  Status Post Gastrostomy Tube (G Tube) Placement, Follow-Up Exam          REVIEW OF SYSTEMS:  CONSTITUTIONAL: No fever, weight loss, or fatigue  EYES: No eye pain, visual disturbances, or discharge  NECK: No pain or stiffness  RESPIRATORY: No cough, wheezing, chills or hemoptysis; No Shortness of Breath  CARDIOVASCULAR: No chest pain, palpitations, passing out, dizziness, or leg swelling  GASTROINTESTINAL: No abdominal or epigastric pain. No nausea, vomiting, or hematemesis; No diarrhea or constipation. No melena or hematochezia.  GENITOURINARY: No dysuria, frequency, hematuria, or incontinence  NEUROLOGICAL: No headaches, memory loss, loss of strength, numbness, or tremors  SKIN: No itching, burning, rashes, or lesions   LYMPH Nodes: No enlarged glands  ENDOCRINE: No heat or cold intolerance; No hair loss  MUSCULOSKELETAL: No joint pain or swelling; No muscle, back, or extremity pain    Medications:  MEDICATIONS  (STANDING):  BACItracin   Ointment 1 Application(s) Topical two times a day  valproate sodium IVPB 250 milliGRAM(s) IV Intermittent every 8 hours  dextrose 5% + sodium chloride 0.9%. 1000 milliLiter(s) (100 mL/Hr) IV Continuous <Continuous>  heparin  Injectable 5000 Unit(s) SubCutaneous every 12 hours  pantoprazole  Injectable 40 milliGRAM(s) IV Push daily  cefepime  IVPB 2000 milliGRAM(s) IV Intermittent every 12 hours  primidone 100 milliGRAM(s) Oral two times a day    MEDICATIONS  (PRN):  ALBUTerol/ipratropium for Nebulization 3 milliLiter(s) Nebulizer every 6 hours PRN Shortness of Breath and/or Wheezing    	    PHYSICAL EXAM:  T(C): 36.7 (08-02-17 @ 04:52), Max: 37.1 (08-01-17 @ 15:00)  HR: 88 (08-02-17 @ 04:52) (52 - 103)  BP: 98/65 (08-02-17 @ 04:52) (98/65 - 132/80)  RR: 17 (08-02-17 @ 04:52) (17 - 148)  SpO2: 100% (08-02-17 @ 04:52) (94% - 100%)  Wt(kg): --  I&O's Summary    01 Aug 2017 07:01  -  02 Aug 2017 07:00  --------------------------------------------------------  IN: 750 mL / OUT: 0 mL / NET: 750 mL    02 Aug 2017 07:01  -  02 Aug 2017 10:40  --------------------------------------------------------  IN: 50 mL / OUT: 0 mL / NET: 50 mL        Appearance: Normal	  HEENT:   Normal oral mucosa, PERRL, EOMI	  Lymphatic: No lymphadenopathy  Cardiovascular: Normal S1 S2, No JVD, No murmurs, No edema  Respiratory: Lungs clear to auscultation	    Gastrointestinal:  Soft, Non-tender, + BS	tube replaced  redness resolved   Skin: No rashes, No ecchymoses, No cyanosis	  Neurologic: Non-focal severe mental retardation  Extremitiescontractures  Vascular: Peripheral pulses palpable    TELEMETRY: 	    ECG:  	  RADIOLOGY:  OTHER: 	  	  LABS:	 	    CARDIAC MARKERS:                                14.3   12.72 )-----------( 310      ( 02 Aug 2017 07:26 )             43.6     08-02    134<L>  |  95<L>  |  6<L>  ----------------------------<  95  4.3   |  26  |  0.40<L>    Ca    9.0      02 Aug 2017 07:33    TPro  7.0  /  Alb  3.6  /  TBili  0.4  /  DBili  0.2  /  AST  26  /  ALT  13  /  AlkPhos  56  08-02    proBNP:   Lipid Profile:   HgA1c:   TSH:

## 2017-08-02 NOTE — PROGRESS NOTE ADULT - SUBJECTIVE AND OBJECTIVE BOX
[facility]HA [facility]:[id number],   26yMale followed for: peg dysfunction  No Known Allergies    PAST MEDICAL & SURGICAL HISTORY:  History of Scoliosis  Mental Retardation  CP (Cerebral Palsy)  RAD (Reactive Airway Disease)  Seizure Disorder  Hip Dislocation: s/p repair 1992, 1996  Tendon Contracture: ankles b/l  Status Post Unilateral Inguinal Hernia Repair: Undescended testes b/l  Status Post Myringotomy with Insertion of Tube: 1999x2, 1994x1,1995x1, 1996x1,1998 x2  Status Post Nissen Fundoplication (with Gastrostomy Tube Placement)  Status Post Nissen Fundoplication (with Gastrostomy Tube Placement)  Status Post Gastrostomy Tube (G Tube) Placement, Follow-Up Exam    FAMILY HISTORY:  No pertinent family history in first degree relatives    MEDICATIONS  (STANDING):  BACItracin   Ointment 1 Application(s) Topical two times a day  valproate sodium IVPB 250 milliGRAM(s) IV Intermittent every 8 hours  dextrose 5% + sodium chloride 0.9%. 1000 milliLiter(s) (100 mL/Hr) IV Continuous <Continuous>  heparin  Injectable 5000 Unit(s) SubCutaneous every 12 hours  pantoprazole  Injectable 40 milliGRAM(s) IV Push daily  cefepime  IVPB 2000 milliGRAM(s) IV Intermittent every 12 hours    MEDICATIONS  (PRN):  ALBUTerol/ipratropium for Nebulization 3 milliLiter(s) Nebulizer every 6 hours PRN Shortness of Breath and/or Wheezing      Vital Signs Last 24 Hrs  T(C): 36.7 (02 Aug 2017 04:52), Max: 37.1 (01 Aug 2017 15:00)  T(F): 98 (02 Aug 2017 04:52), Max: 98.8 (01 Aug 2017 15:00)  HR: 88 (02 Aug 2017 04:52) (52 - 103)  BP: 98/65 (02 Aug 2017 04:52) (98/65 - 132/80)  BP(mean): --  RR: 17 (02 Aug 2017 04:52) (17 - 148)  SpO2: 100% (02 Aug 2017 04:52) (94% - 100%)  nc/at  s1s2  cta  soft, nt, nd no guarding or rebound  no c/c/e    CBC Full  -  ( 01 Aug 2017 10:47 )  WBC Count : 14.1 K/uL  Hemoglobin : 15.5 g/dL  Hematocrit : 47.0 %  Platelet Count - Automated : 321 K/uL  Mean Cell Volume : 94.8 fl  Mean Cell Hemoglobin : 31.2 pg  Mean Cell Hemoglobin Concentration : 32.9 gm/dL  Auto Neutrophil # : x  Auto Lymphocyte # : x  Auto Monocyte # : x  Auto Eosinophil # : x  Auto Basophil # : x  Auto Neutrophil % : x  Auto Lymphocyte % : x  Auto Monocyte % : x  Auto Eosinophil % : x  Auto Basophil % : x    08-01    137  |  99  |  9   ----------------------------<  100<H>  4.6   |  25  |  0.29<L>    Ca    9.5      01 Aug 2017 10:47    TPro  7.9  /  Alb  4.1  /  TBili  0.3  /  DBili  x   /  AST  26  /  ALT  20  /  AlkPhos  59  07-31

## 2017-08-03 VITALS
TEMPERATURE: 98 F | RESPIRATION RATE: 17 BRPM | HEART RATE: 99 BPM | SYSTOLIC BLOOD PRESSURE: 134 MMHG | DIASTOLIC BLOOD PRESSURE: 60 MMHG | OXYGEN SATURATION: 92 %

## 2017-08-03 LAB
ANION GAP SERPL CALC-SCNC: 13 MMOL/L — SIGNIFICANT CHANGE UP (ref 5–17)
BUN SERPL-MCNC: 4 MG/DL — LOW (ref 7–23)
CALCIUM SERPL-MCNC: 8.7 MG/DL — SIGNIFICANT CHANGE UP (ref 8.4–10.5)
CHLORIDE SERPL-SCNC: 96 MMOL/L — SIGNIFICANT CHANGE UP (ref 96–108)
CO2 SERPL-SCNC: 26 MMOL/L — SIGNIFICANT CHANGE UP (ref 22–31)
CREAT SERPL-MCNC: 0.32 MG/DL — LOW (ref 0.5–1.3)
GLUCOSE SERPL-MCNC: 120 MG/DL — HIGH (ref 70–99)
HCT VFR BLD CALC: 42.6 % — SIGNIFICANT CHANGE UP (ref 39–50)
HGB BLD-MCNC: 14.4 G/DL — SIGNIFICANT CHANGE UP (ref 13–17)
MCHC RBC-ENTMCNC: 30.3 PG — SIGNIFICANT CHANGE UP (ref 27–34)
MCHC RBC-ENTMCNC: 33.8 GM/DL — SIGNIFICANT CHANGE UP (ref 32–36)
MCV RBC AUTO: 89.7 FL — SIGNIFICANT CHANGE UP (ref 80–100)
PLATELET # BLD AUTO: 278 K/UL — SIGNIFICANT CHANGE UP (ref 150–400)
POTASSIUM SERPL-MCNC: 3.4 MMOL/L — LOW (ref 3.5–5.3)
POTASSIUM SERPL-SCNC: 3.4 MMOL/L — LOW (ref 3.5–5.3)
RBC # BLD: 4.75 M/UL — SIGNIFICANT CHANGE UP (ref 4.2–5.8)
RBC # FLD: 13.1 % — SIGNIFICANT CHANGE UP (ref 10.3–14.5)
SODIUM SERPL-SCNC: 135 MMOL/L — SIGNIFICANT CHANGE UP (ref 135–145)
WBC # BLD: 11.68 K/UL — HIGH (ref 3.8–10.5)
WBC # FLD AUTO: 11.68 K/UL — HIGH (ref 3.8–10.5)

## 2017-08-03 PROCEDURE — 80048 BASIC METABOLIC PNL TOTAL CA: CPT

## 2017-08-03 PROCEDURE — 87086 URINE CULTURE/COLONY COUNT: CPT

## 2017-08-03 PROCEDURE — 93005 ELECTROCARDIOGRAM TRACING: CPT

## 2017-08-03 PROCEDURE — 82947 ASSAY GLUCOSE BLOOD QUANT: CPT

## 2017-08-03 PROCEDURE — 82140 ASSAY OF AMMONIA: CPT

## 2017-08-03 PROCEDURE — 84132 ASSAY OF SERUM POTASSIUM: CPT

## 2017-08-03 PROCEDURE — 82330 ASSAY OF CALCIUM: CPT

## 2017-08-03 PROCEDURE — 80164 ASSAY DIPROPYLACETIC ACD TOT: CPT

## 2017-08-03 PROCEDURE — 85014 HEMATOCRIT: CPT

## 2017-08-03 PROCEDURE — L8699: CPT

## 2017-08-03 PROCEDURE — 84145 PROCALCITONIN (PCT): CPT

## 2017-08-03 PROCEDURE — 74177 CT ABD & PELVIS W/CONTRAST: CPT

## 2017-08-03 PROCEDURE — C1769: CPT

## 2017-08-03 PROCEDURE — 99285 EMERGENCY DEPT VISIT HI MDM: CPT | Mod: 25

## 2017-08-03 PROCEDURE — 87040 BLOOD CULTURE FOR BACTERIA: CPT

## 2017-08-03 PROCEDURE — 81001 URINALYSIS AUTO W/SCOPE: CPT

## 2017-08-03 PROCEDURE — 49450 REPLACE G/C TUBE PERC: CPT

## 2017-08-03 PROCEDURE — 71045 X-RAY EXAM CHEST 1 VIEW: CPT

## 2017-08-03 PROCEDURE — 96374 THER/PROPH/DIAG INJ IV PUSH: CPT

## 2017-08-03 PROCEDURE — 83605 ASSAY OF LACTIC ACID: CPT

## 2017-08-03 PROCEDURE — 74000: CPT

## 2017-08-03 PROCEDURE — 80053 COMPREHEN METABOLIC PANEL: CPT

## 2017-08-03 PROCEDURE — 82435 ASSAY OF BLOOD CHLORIDE: CPT

## 2017-08-03 PROCEDURE — 84295 ASSAY OF SERUM SODIUM: CPT

## 2017-08-03 PROCEDURE — 80076 HEPATIC FUNCTION PANEL: CPT

## 2017-08-03 PROCEDURE — 82803 BLOOD GASES ANY COMBINATION: CPT

## 2017-08-03 PROCEDURE — 96375 TX/PRO/DX INJ NEW DRUG ADDON: CPT

## 2017-08-03 PROCEDURE — 85027 COMPLETE CBC AUTOMATED: CPT

## 2017-08-03 PROCEDURE — 80188 ASSAY OF PRIMIDONE: CPT

## 2017-08-03 RX ADMIN — PANTOPRAZOLE SODIUM 40 MILLIGRAM(S): 20 TABLET, DELAYED RELEASE ORAL at 11:19

## 2017-08-03 RX ADMIN — SODIUM CHLORIDE 100 MILLILITER(S): 9 INJECTION, SOLUTION INTRAVENOUS at 08:46

## 2017-08-03 RX ADMIN — CEFEPIME 100 MILLIGRAM(S): 1 INJECTION, POWDER, FOR SOLUTION INTRAMUSCULAR; INTRAVENOUS at 08:46

## 2017-08-03 RX ADMIN — Medication 250 MILLIGRAM(S): at 06:51

## 2017-08-03 RX ADMIN — SODIUM CHLORIDE 100 MILLILITER(S): 9 INJECTION, SOLUTION INTRAVENOUS at 06:52

## 2017-08-03 RX ADMIN — PRIMIDONE 100 MILLIGRAM(S): 250 TABLET ORAL at 06:51

## 2017-08-03 RX ADMIN — Medication 1 APPLICATION(S): at 06:51

## 2017-08-03 RX ADMIN — HEPARIN SODIUM 5000 UNIT(S): 5000 INJECTION INTRAVENOUS; SUBCUTANEOUS at 06:52

## 2017-08-03 NOTE — PROGRESS NOTE ADULT - SUBJECTIVE AND OBJECTIVE BOX
Follow-up Pulm Progress Note    No new respiratory events overnight.   Appears comfortable  No cough/sputum production per father at bedside    Medications:  MEDICATIONS  (STANDING):  BACItracin   Ointment 1 Application(s) Topical two times a day  dextrose 5% + sodium chloride 0.9%. 1000 milliLiter(s) (100 mL/Hr) IV Continuous <Continuous>  heparin  Injectable 5000 Unit(s) SubCutaneous every 12 hours  pantoprazole  Injectable 40 milliGRAM(s) IV Push daily  cefepime  IVPB 2000 milliGRAM(s) IV Intermittent every 12 hours  primidone 100 milliGRAM(s) Oral two times a day  valproic  acid Syrup 250 milliGRAM(s) Oral three times a day    MEDICATIONS  (PRN):  ALBUTerol/ipratropium for Nebulization 3 milliLiter(s) Nebulizer every 6 hours PRN Shortness of Breath and/or Wheezing          Vital Signs Last 24 Hrs  T(C): 37.4 (03 Aug 2017 05:42), Max: 37.4 (03 Aug 2017 05:42)  T(F): 99.4 (03 Aug 2017 05:42), Max: 99.4 (03 Aug 2017 05:42)  HR: 98 (03 Aug 2017 05:42) (98 - 111)  BP: 113/73 (03 Aug 2017 05:42) (113/73 - 147/98)  BP(mean): --  RR: 17 (03 Aug 2017 05:42) (17 - 17)  SpO2: 92% (03 Aug 2017 05:42) (92% - 95%) on RA          08-02 @ 07:01  -  08-03 @ 07:00  --------------------------------------------------------  IN: 3157 mL / OUT: 0 mL / NET: 3157 mL          LABS:                        14.4   11.68 )-----------( 278      ( 03 Aug 2017 07:37 )             42.6     08-03    135  |  96  |  4<L>  ----------------------------<  120<H>  3.4<L>   |  26  |  0.32<L>    Ca    8.7      03 Aug 2017 07:16    TPro  7.0  /  Alb  3.6  /  TBili  0.4  /  DBili  0.2  /  AST  26  /  ALT  13  /  AlkPhos  56  08-02          CAPILLARY BLOOD GLUCOSE            Procalcitonin, Serum: <0.05 ng/mL (31 Jul 2017 13:07)          hysical Examination:  PULM: Clear to auscultation bilaterally, no significant sputum production  CVS: S1, S2 heard    RADIOLOGY REVIEWED    CT chest: Follow-up Pulm Progress Note    No new respiratory events overnight.  Denies SOB/CP.     Medications:  MEDICATIONS  (STANDING):  BACItracin   Ointment 1 Application(s) Topical two times a day  dextrose 5% + sodium chloride 0.9%. 1000 milliLiter(s) (100 mL/Hr) IV Continuous <Continuous>  heparin  Injectable 5000 Unit(s) SubCutaneous every 12 hours  pantoprazole  Injectable 40 milliGRAM(s) IV Push daily  cefepime  IVPB 2000 milliGRAM(s) IV Intermittent every 12 hours  primidone 100 milliGRAM(s) Oral two times a day  valproic  acid Syrup 250 milliGRAM(s) Oral three times a day    MEDICATIONS  (PRN):  ALBUTerol/ipratropium for Nebulization 3 milliLiter(s) Nebulizer every 6 hours PRN Shortness of Breath and/or Wheezing          Vital Signs Last 24 Hrs  T(C): 37.4 (03 Aug 2017 05:42), Max: 37.4 (03 Aug 2017 05:42)  T(F): 99.4 (03 Aug 2017 05:42), Max: 99.4 (03 Aug 2017 05:42)  HR: 98 (03 Aug 2017 05:42) (98 - 111)  BP: 113/73 (03 Aug 2017 05:42) (113/73 - 147/98)  BP(mean): --  RR: 17 (03 Aug 2017 05:42) (17 - 17)  SpO2: 92% (03 Aug 2017 05:42) (92% - 95%)          08-02 @ 07:01  -  08-03 @ 07:00  --------------------------------------------------------  IN: 3157 mL / OUT: 0 mL / NET: 3157 mL          LABS:                        14.4   11.68 )-----------( 278      ( 03 Aug 2017 07:37 )             42.6     08-03    135  |  96  |  4<L>  ----------------------------<  120<H>  3.4<L>   |  26  |  0.32<L>    Ca    8.7      03 Aug 2017 07:16    TPro  7.0  /  Alb  3.6  /  TBili  0.4  /  DBili  0.2  /  AST  26  /  ALT  13  /  AlkPhos  56  08-02          CAPILLARY BLOOD GLUCOSE            Procalcitonin, Serum: <0.05 ng/mL (31 Jul 2017 13:07)                  CULTURES: (if applicable)          Physical Examination:  PULM: Clear to auscultation bilaterally, no significant sputum production  CVS: S1, S2 RRR    RADIOLOGY REVIEWED

## 2017-08-03 NOTE — PROGRESS NOTE ADULT - ASSESSMENT
pt w/ cerebral palsy/ mental retardation  peg dislodgement reinserted new tube  feeds  leukocytosis improved  gi  f/u noted  seizures/ neuro f/u noted  cont antiseizure meds  d/c home if ct a/p neg and cleared by id and neuro     neuro eval noted  cont meds  discussed w/ father at bedside  id  f/u noted     abd cellulitis improved / abs d/c as per id   ct a/p neg  d/c planning

## 2017-08-03 NOTE — PROGRESS NOTE ADULT - PROBLEM SELECTOR PLAN 1
-While CXR is difficult to interpret given his severe scoliosis and thoracic cage abnormality, he has Nissen Fundoplication so there is less concern for aspiration PNA  -No clinical S/S of PNA, will monitor closely  -d/c supplemental oxygen  -Duoneb PRN (home med)  -d/c planning
-While CXR is difficult to interpret given his severe scoliosis and thoracic cage abnormality, he has Nissen Fundoplication so there is less concern for aspiration PNA  -No clinical S/S of PNA, will monitor closely  -d/c supplemental oxygen  -Duoneb PRN (home med).
- VPA load with 750mg IV until PEG is cleared for use   - c/w maintenance VPA 250mg q8h for now  - VPA level, LFTs, ammonia for morning  - if PEG is cleared for use, can start back on Primidone, 1 dose tonight  - attending to evaluate in am  - plan d/w primary team
- f/u VPA levels  - c/w VPA 250mg TID, Primidone 100mg BID as at home  - f/u LFTs, ammonia  - will f/u PRN, call with any further questions
-While CXR is difficult to interpret given his severe scoliosis and thoracic cage abnormality, he has Nissen Fundoplication so there is less concern for aspiration PNA  -No clinical S/S of PNA, will monitor closely  -Wean supplemental oxygen  -Duoneb PRN (home med).

## 2017-08-03 NOTE — PROGRESS NOTE ADULT - SUBJECTIVE AND OBJECTIVE BOX
RADHA [facility]:[id number],   26yMale followed for: dysphagia  No Known Allergies    PAST MEDICAL & SURGICAL HISTORY:  History of Scoliosis  Mental Retardation  CP (Cerebral Palsy)  RAD (Reactive Airway Disease)  Seizure Disorder  Hip Dislocation: s/p repair 1992, 1996  Tendon Contracture: ankles b/l  Status Post Unilateral Inguinal Hernia Repair: Undescended testes b/l  Status Post Myringotomy with Insertion of Tube: 1999x2, 1994x1,1995x1, 1996x1,1998 x2  Status Post Nissen Fundoplication (with Gastrostomy Tube Placement)  Status Post Nissen Fundoplication (with Gastrostomy Tube Placement)  Status Post Gastrostomy Tube (G Tube) Placement, Follow-Up Exam    FAMILY HISTORY:  No pertinent family history in first degree relatives    MEDICATIONS  (STANDING):  BACItracin   Ointment 1 Application(s) Topical two times a day  dextrose 5% + sodium chloride 0.9%. 1000 milliLiter(s) (100 mL/Hr) IV Continuous <Continuous>  heparin  Injectable 5000 Unit(s) SubCutaneous every 12 hours  pantoprazole  Injectable 40 milliGRAM(s) IV Push daily  cefepime  IVPB 2000 milliGRAM(s) IV Intermittent every 12 hours  primidone 100 milliGRAM(s) Oral two times a day  valproic  acid Syrup 250 milliGRAM(s) Oral three times a day    MEDICATIONS  (PRN):  ALBUTerol/ipratropium for Nebulization 3 milliLiter(s) Nebulizer every 6 hours PRN Shortness of Breath and/or Wheezing      Vital Signs Last 24 Hrs  T(C): 37.4 (03 Aug 2017 05:42), Max: 37.4 (03 Aug 2017 05:42)  T(F): 99.4 (03 Aug 2017 05:42), Max: 99.4 (03 Aug 2017 05:42)  HR: 98 (03 Aug 2017 05:42) (98 - 111)  BP: 113/73 (03 Aug 2017 05:42) (113/73 - 147/98)  BP(mean): --  RR: 17 (03 Aug 2017 05:42) (17 - 17)  SpO2: 92% (03 Aug 2017 05:42) (92% - 95%)  nc/at  s1s2  cta  soft, nt, nd no guarding or rebound  no c/c/e    CBC Full  -  ( 03 Aug 2017 07:37 )  WBC Count : 11.68 K/uL  Hemoglobin : 14.4 g/dL  Hematocrit : 42.6 %  Platelet Count - Automated : 278 K/uL  Mean Cell Volume : 89.7 fl  Mean Cell Hemoglobin : 30.3 pg  Mean Cell Hemoglobin Concentration : 33.8 gm/dL  Auto Neutrophil # : x  Auto Lymphocyte # : x  Auto Monocyte # : x  Auto Eosinophil # : x  Auto Basophil # : x  Auto Neutrophil % : x  Auto Lymphocyte % : x  Auto Monocyte % : x  Auto Eosinophil % : x  Auto Basophil % : x    08-02    134<L>  |  95<L>  |  6<L>  ----------------------------<  95  4.3   |  26  |  0.40<L>    Ca    9.0      02 Aug 2017 07:33    TPro  7.0  /  Alb  3.6  /  TBili  0.4  /  DBili  0.2  /  AST  26  /  ALT  13  /  AlkPhos  56  08-02

## 2017-08-03 NOTE — PROGRESS NOTE ADULT - PROBLEM SELECTOR PLAN 2
-Cefepime per ID recs  -Pending CT A/P given persistent leukocytosis
-s/p cefepime
-Cefepime per ID recs  -Looks much improved today

## 2017-08-03 NOTE — PROGRESS NOTE ADULT - SUBJECTIVE AND OBJECTIVE BOX
CHIEF COMPLAINT:Patient is a 26y old  Male who presents with a chief complaint of Cellulitis of abdominal wall (02 Aug 2017 11:51)    	        PAST MEDICAL & SURGICAL HISTORY:  History of Scoliosis  Mental Retardation  CP (Cerebral Palsy)  RAD (Reactive Airway Disease)  Seizure Disorder  Hip Dislocation: s/p repair 1992, 1996  Tendon Contracture: ankles b/l  Status Post Unilateral Inguinal Hernia Repair: Undescended testes b/l  Status Post Myringotomy with Insertion of Tube: 1999x2, 1994x1,1995x1, 1996x1,1998 x2  Status Post Nissen Fundoplication (with Gastrostomy Tube Placement)  Status Post Nissen Fundoplication (with Gastrostomy Tube Placement)  Status Post Gastrostomy Tube (G Tube) Placement, Follow-Up Exam          REVIEW OF SYSTEMS:  CONSTITUTIONAL: No fever, weight loss, or fatigue  EYES: No eye pain, visual disturbances, or discharge  NECK: No pain or stiffness  RESPIRATORY: No cough, wheezing, chills or hemoptysis; No Shortness of Breath  CARDIOVASCULAR: No chest pain, palpitations, passing out, dizziness, or leg swelling  GASTROINTESTINAL: No abdominal or epigastric pain. No nausea, vomiting, or hematemesis; No diarrhea or constipation. No melena or hematochezia.  GENITOURINARY: No dysuria, frequency, hematuria, or incontinence  NEUROLOGICAL: No headaches, memory loss, loss of strength, numbness, or tremors  SKIN: No itching, burning, rashes, or lesions   LYMPH Nodes: No enlarged glands  ENDOCRINE: No heat or cold intolerance; No hair loss  MUSCULOSKELETAL: No joint pain or swelling; No muscle, back, or extremity pain    Medications:  MEDICATIONS  (STANDING):  BACItracin   Ointment 1 Application(s) Topical two times a day  dextrose 5% + sodium chloride 0.9%. 1000 milliLiter(s) (100 mL/Hr) IV Continuous <Continuous>  heparin  Injectable 5000 Unit(s) SubCutaneous every 12 hours  pantoprazole  Injectable 40 milliGRAM(s) IV Push daily  cefepime  IVPB 2000 milliGRAM(s) IV Intermittent every 12 hours  primidone 100 milliGRAM(s) Oral two times a day  valproic  acid Syrup 250 milliGRAM(s) Oral three times a day    MEDICATIONS  (PRN):  ALBUTerol/ipratropium for Nebulization 3 milliLiter(s) Nebulizer every 6 hours PRN Shortness of Breath and/or Wheezing    	    PHYSICAL EXAM:  T(C): 37.4 (08-03-17 @ 05:42), Max: 37.4 (08-03-17 @ 05:42)  HR: 98 (08-03-17 @ 05:42) (98 - 111)  BP: 113/73 (08-03-17 @ 05:42) (113/73 - 147/98)  RR: 17 (08-03-17 @ 05:42) (17 - 17)  SpO2: 92% (08-03-17 @ 05:42) (92% - 95%)  Wt(kg): --  I&O's Summary    02 Aug 2017 07:01  -  03 Aug 2017 07:00  --------------------------------------------------------  IN: 3157 mL / OUT: 0 mL / NET: 3157 mL    03 Aug 2017 07:01  -  03 Aug 2017 10:22  --------------------------------------------------------  IN: 50 mL / OUT: 0 mL / NET: 50 mL        Appearance: Normal	  HEENT:   Normal oral mucosa, PERRL, EOMI	  Lymphatic: No lymphadenopathy  Cardiovascular: Normal S1 S2, No JVD, No murmurs, No edema  Respiratory: Lungs clear to auscultation	  Gastrointestinal:  Soft, Non-tender, + BS	tube in place  Skin: No rashes, No ecchymoses, No cyanosis	  Neurologic: sever mr / contractures  Extremities: contractures  Vascular: Peripheral pulses palpable    TELEMETRY: 	    ECG:  	  RADIOLOGY:  OTHER: 	  	  LABS:	 	    CARDIAC MARKERS:                                14.4   11.68 )-----------( 278      ( 03 Aug 2017 07:37 )             42.6     08-03    135  |  96  |  4<L>  ----------------------------<  120<H>  3.4<L>   |  26  |  0.32<L>    Ca    8.7      03 Aug 2017 07:16    TPro  7.0  /  Alb  3.6  /  TBili  0.4  /  DBili  0.2  /  AST  26  /  ALT  13  /  AlkPhos  56  08-02    proBNP:   Lipid Profile:   HgA1c:   TSH:

## 2017-08-04 LAB
PHENOBARB SERPL-MCNC: <5 MG/L — LOW (ref 15–40)
PRIMIDONE FLD-MCNC: <2.5 MG/L — SIGNIFICANT CHANGE UP (ref 5–12)

## 2017-08-05 LAB
CULTURE RESULTS: SIGNIFICANT CHANGE UP
CULTURE RESULTS: SIGNIFICANT CHANGE UP
SPECIMEN SOURCE: SIGNIFICANT CHANGE UP
SPECIMEN SOURCE: SIGNIFICANT CHANGE UP

## 2017-12-20 ENCOUNTER — OUTPATIENT (OUTPATIENT)
Dept: OUTPATIENT SERVICES | Facility: HOSPITAL | Age: 27
LOS: 1 days | End: 2017-12-20
Payer: COMMERCIAL

## 2017-12-20 VITALS
SYSTOLIC BLOOD PRESSURE: 117 MMHG | WEIGHT: 110.01 LBS | TEMPERATURE: 98 F | RESPIRATION RATE: 16 BRPM | HEIGHT: 60 IN | DIASTOLIC BLOOD PRESSURE: 81 MMHG | OXYGEN SATURATION: 95 % | HEART RATE: 81 BPM

## 2017-12-20 DIAGNOSIS — K02.9 DENTAL CARIES, UNSPECIFIED: ICD-10-CM

## 2017-12-20 DIAGNOSIS — G40.909 EPILEPSY, UNSPECIFIED, NOT INTRACTABLE, WITHOUT STATUS EPILEPTICUS: ICD-10-CM

## 2017-12-20 DIAGNOSIS — Z01.818 ENCOUNTER FOR OTHER PREPROCEDURAL EXAMINATION: ICD-10-CM

## 2017-12-20 PROCEDURE — G0463: CPT

## 2017-12-20 RX ORDER — BACITRACIN ZINC 500 UNIT/G
1 OINTMENT IN PACKET (EA) TOPICAL
Qty: 0 | Refills: 0 | COMMUNITY

## 2017-12-20 NOTE — H&P PST ADULT - HISTORY OF PRESENT ILLNESS
The patient is a 26 yr old male w/ cerebral palsy/ metal retardation  had a peg tube placed when he was [age] years old. Since that time he has received his feeds as well as medications through it. However, yesterday after his 2PM feed his tube became dislodged at some point, as his caregiver/mother found at 9PM while changing him to prepare for bed. At that time they deflated the tube's cuff and attempted to replace it. However, they are uncertain if his tube is in good placement, and have subsequently not fed him or been able to give him his morning seizure medication, thus prompting them to bring him in to the ED. His family and caregiver deny any recent fevers, cough, cold symptoms, abnormal behavior, seizure activity, or other new symptoms of concern.    History is obtained from the patient's caregiver and family as the patient is non-verbal at baseline. The patient is a 27 yr old male w/ cerebral palsy/ metal retardation, non verbal with peg tube for feedings and meds. Patient presents pre comprehensive dental work scheduled for 12/28/17.    History is obtained from the patient's father as the patient is non-verbal at baseline.

## 2017-12-20 NOTE — H&P PST ADULT - NSANTHOSAYNRD_GEN_A_CORE
No. MADY screening performed.  STOP BANG Legend: 0-2 = LOW Risk; 3-4 = INTERMEDIATE Risk; 5-8 = HIGH Risk

## 2017-12-20 NOTE — H&P PST ADULT - PSH
Hip Dislocation  s/p repair 1992, 1996  Status Post Gastrostomy Tube (G Tube) Placement, Follow-Up Exam    Status Post Myringotomy with Insertion of Tube  1999x2, 1994x1,1995x1, 1996x1,1998 x2  Status Post Nissen Fundoplication (with Gastrostomy Tube Placement)    Status Post Nissen Fundoplication (with Gastrostomy Tube Placement)    Status Post Unilateral Inguinal Hernia Repair  Undescended testes b/l  Tendon Contracture  ankles b/l Status Post Gastrostomy Tube (G Tube) Placement, Follow-Up Exam    Status Post Myringotomy with Insertion of Tube  1999x2, 1994x1,1995x1, 1996x1,1998 x2  Status Post Nissen Fundoplication (with Gastrostomy Tube Placement)    Status Post Nissen Fundoplication (with Gastrostomy Tube Placement)    Status Post Unilateral Inguinal Hernia Repair  Undescended testes b/l  Tendon Contracture  ankles b/l

## 2017-12-20 NOTE — H&P PST ADULT - PMH
CP (Cerebral Palsy)    History of Scoliosis    Mental Retardation    RAD (Reactive Airway Disease)    Seizure Disorder CP (Cerebral Palsy)    Dental caries    History of Scoliosis    Mental Retardation    RAD (Reactive Airway Disease)    Seizure Disorder

## 2017-12-28 ENCOUNTER — TRANSCRIPTION ENCOUNTER (OUTPATIENT)
Age: 27
End: 2017-12-28

## 2017-12-28 ENCOUNTER — OUTPATIENT (OUTPATIENT)
Dept: OUTPATIENT SERVICES | Facility: HOSPITAL | Age: 27
LOS: 1 days | End: 2017-12-28
Payer: COMMERCIAL

## 2017-12-28 VITALS
OXYGEN SATURATION: 93 % | WEIGHT: 110.01 LBS | HEIGHT: 60 IN | SYSTOLIC BLOOD PRESSURE: 83 MMHG | HEART RATE: 83 BPM | TEMPERATURE: 98 F | RESPIRATION RATE: 24 BRPM | DIASTOLIC BLOOD PRESSURE: 61 MMHG

## 2017-12-28 VITALS
OXYGEN SATURATION: 94 % | RESPIRATION RATE: 18 BRPM | SYSTOLIC BLOOD PRESSURE: 120 MMHG | DIASTOLIC BLOOD PRESSURE: 70 MMHG | HEART RATE: 102 BPM

## 2017-12-28 DIAGNOSIS — K02.9 DENTAL CARIES, UNSPECIFIED: ICD-10-CM

## 2017-12-28 DIAGNOSIS — Z01.818 ENCOUNTER FOR OTHER PREPROCEDURAL EXAMINATION: ICD-10-CM

## 2017-12-28 PROCEDURE — D4341: CPT

## 2017-12-28 RX ORDER — PRIMIDONE 250 MG/1
2 TABLET ORAL
Qty: 0 | Refills: 0 | COMMUNITY

## 2017-12-28 RX ORDER — ALBUTEROL 90 UG/1
2.5 AEROSOL, METERED ORAL ONCE
Qty: 0 | Refills: 0 | Status: DISCONTINUED | OUTPATIENT
Start: 2017-12-28 | End: 2018-01-12

## 2017-12-28 RX ORDER — ONDANSETRON 8 MG/1
4 TABLET, FILM COATED ORAL ONCE
Qty: 0 | Refills: 0 | Status: DISCONTINUED | OUTPATIENT
Start: 2017-12-28 | End: 2018-01-12

## 2017-12-28 RX ORDER — SODIUM CHLORIDE 9 MG/ML
1000 INJECTION, SOLUTION INTRAVENOUS
Qty: 0 | Refills: 0 | Status: DISCONTINUED | OUTPATIENT
Start: 2017-12-28 | End: 2018-01-12

## 2017-12-28 RX ORDER — ACETAMINOPHEN 500 MG
1000 TABLET ORAL ONCE
Qty: 0 | Refills: 0 | Status: DISCONTINUED | OUTPATIENT
Start: 2017-12-28 | End: 2018-01-12

## 2017-12-28 RX ORDER — DIVALPROEX SODIUM 500 MG/1
0 TABLET, DELAYED RELEASE ORAL
Qty: 0 | Refills: 0 | COMMUNITY

## 2017-12-28 NOTE — BRIEF OPERATIVE NOTE - PROCEDURE
<<-----Click on this checkbox to enter Procedure Dental exam, with x-ray imaging and dental cleaning  12/28/2017    Active  [id number]

## 2017-12-28 NOTE — BRIEF OPERATIVE NOTE - OPERATION/FINDINGS
Generalized gingival hyperplasia secondary to antisiezure medications  Generalized gingivitis with mild to moderate calculus accumulation

## 2017-12-28 NOTE — ASU DISCHARGE PLAN (ADULT/PEDIATRIC). - NOTIFY
Bleeding that does not stop/Fever greater than 101 Increased Irritability or Sluggishness/Fever greater than 101/Inability to Tolerate Liquids or Foods/Bleeding that does not stop/Persistent Nausea and Vomiting/Pain not relieved by Medications/Swelling that continues/Unable to Urinate

## 2017-12-28 NOTE — ASU PATIENT PROFILE, ADULT - PSH
Status Post Gastrostomy Tube (G Tube) Placement, Follow-Up Exam    Status Post Myringotomy with Insertion of Tube  1999x2, 1994x1,1995x1, 1996x1,1998 x2  Status Post Nissen Fundoplication (with Gastrostomy Tube Placement)    Status Post Nissen Fundoplication (with Gastrostomy Tube Placement)    Status Post Unilateral Inguinal Hernia Repair  Undescended testes b/l  Tendon Contracture  ankles b/l

## 2017-12-28 NOTE — ASU PATIENT PROFILE, ADULT - PMH
CP (Cerebral Palsy)    Dental caries    History of Scoliosis    Mental Retardation    RAD (Reactive Airway Disease)    Seizure Disorder

## 2018-04-03 ENCOUNTER — EMERGENCY (EMERGENCY)
Facility: HOSPITAL | Age: 28
LOS: 1 days | Discharge: HOME | End: 2018-04-03
Attending: EMERGENCY MEDICINE
Payer: COMMERCIAL

## 2018-04-03 VITALS
DIASTOLIC BLOOD PRESSURE: 83 MMHG | RESPIRATION RATE: 18 BRPM | HEART RATE: 95 BPM | OXYGEN SATURATION: 100 % | SYSTOLIC BLOOD PRESSURE: 122 MMHG

## 2018-04-03 VITALS
OXYGEN SATURATION: 94 % | HEART RATE: 93 BPM | RESPIRATION RATE: 18 BRPM | SYSTOLIC BLOOD PRESSURE: 141 MMHG | DIASTOLIC BLOOD PRESSURE: 92 MMHG | TEMPERATURE: 97 F

## 2018-04-03 PROCEDURE — L8699: CPT

## 2018-04-03 PROCEDURE — 99283 EMERGENCY DEPT VISIT LOW MDM: CPT | Mod: 25

## 2018-04-03 PROCEDURE — 49465 FLUORO EXAM OF G/COLON TUBE: CPT

## 2018-04-03 PROCEDURE — 43760 CHANGE GASTROSTOMY TUBE PERCUTANEOUS W/O GUIDE: CPT

## 2018-04-03 PROCEDURE — 99284 EMERGENCY DEPT VISIT MOD MDM: CPT | Mod: 25

## 2018-04-03 PROCEDURE — 43760: CPT

## 2018-04-03 NOTE — PROCEDURE NOTE - GENERAL PROCEDURE DETAILS
Cleaned and prepped in sterile fashion. Old PEG tube deflated without issue and tube pulled without resistance. New 18F PEG inserted without resistance. 8cc saline injected to inflate balloon.

## 2018-04-03 NOTE — ED ADULT NURSE NOTE - OBJECTIVE STATEMENT
Pt bib parents for replacement of his feeding tube which has been in place for several months, because it wasn't not working properly and looked to be ready to break.  No other c/o.  Oral mucosa moist.

## 2018-04-03 NOTE — ED PROVIDER NOTE - MEDICAL DECISION MAKING DETAILS
27M PM CP sent by Dr. Freed for PEG tube exchange. Well appearing. Flushing somewhat. 27M PMH CP sent by Dr. Freed for PEG tube exchange. Well appearing. Flushing somewhat.  ATTG: PEG tube replacement, check xray re eval for dispo

## 2018-04-03 NOTE — ED ADULT NURSE NOTE - CHIEF COMPLAINT
The patient is a 27y Male complaining of The patient is a 27y Male complaining of clogged feeding tube.

## 2018-04-03 NOTE — ED PROVIDER NOTE - ATTENDING CONTRIBUTION TO CARE
28 y/o m with pmhx cerebral palsy, mental retardation presents for clogged PEG tube. Was last replaced approx 1 yr ago by Dr. Jones Freed (GI) with 18 Fr.   Parents at bedside. Does not appear to have any pain or nausea/vomiting. Problem ongoing for past 3 days.   Gen.  no acute distress  HEENT:  EOMI PERRL  Lungs:  ctab/l  CVS: S1S2   Abd;  soft no tend  Ext: no edema    Neuro: at baseline per parents.

## 2018-04-03 NOTE — ED PROVIDER NOTE - ABDOMINAL EXAM
soft/nontender.../PEG in place, tube appears old, slightly broken down, material stuck in tube/nondistended

## 2018-04-03 NOTE — ED PROVIDER NOTE - OBJECTIVE STATEMENT
27M PM cerebral palsy, mental retardation presents with clogged PEG tube. Send by Dr. Jones Freed (GI). Placed last year, 18 Kisw[place]ili. Flushing ok, but crushed pills and thicker liquids with increased resistance. No issues with skin surrounding PEG. Parents have not noticed any change in behavior in their son. Does not appear to have any pain or nausea/vomiting. Problem ongoing for past 3 days.

## 2018-04-05 NOTE — ED PROVIDER NOTE - PMH
CP (Cerebral Palsy)    Dental caries    History of Scoliosis    Mental Retardation    RAD (Reactive Airway Disease)    Seizure Disorder Health Care Proxy (HCP)

## 2018-04-06 NOTE — ED PROCEDURE NOTE - CPROC ED POST PROC CARE GUIDE1
Instructed patient/caregiver regarding signs and symptoms of infection./Instructed patient/caregiver to follow-up with primary care physician.

## 2018-04-18 NOTE — H&P PST ADULT - GENITOURINARY
Assessment/Plan:  1  She will resume Macrodantin suppression  2   Cranberry supplements  3  She will culture urine if symptoms, before starting the Cipro for supply febrile to have up to have on hand, self treat for three days if symptoms start  Follow-up for one year  No problem-specific Assessment & Plan notes found for this encounter  Diagnoses and all orders for this visit:    Acute cystitis without hematuria  -     POCT urine dip auto non-scope  -     Urine culture; Future  -     ciprofloxacin (CIPRO) 500 mg tablet; One pill twice per day, three days, when urinary symptoms developed  Other orders  -     phenazopyridine (PYRIDIUM) 95 MG tablet; Take by mouth  -     Discontinue: ciprofloxacin (CIPRO) 500 mg tablet;   -     ibuprofen (MOTRIN) 600 mg tablet; Take by mouth  -     Pumpkin Seed-Soy Germ (AZO BLADDER CONTROL/GO-LESS) CAPS; Take by mouth          Subjective:      Patient ID: Yeny Vásquez is a 39 y o  female  Follow-up for recurrent UTIs  Seen in November 2017, infections every 1-3 months especially with new relationship and more frequent sexual activity  Renal ultrasound was negative then  Has required antibiotics 4 times since we saw her in November 2017  She says Bactrim and Keflex not so helpful for her, Cipro works well  Culture --  last we have November 2017, showed E coli sensitive to all meds  No voiding symptoms, good stream control, nocturia times 1-2  No incontinence  Has had six children        The following portions of the patient's history were reviewed and updated as appropriate: allergies, current medications, past family history, past medical history, past social history, past surgical history and problem list     Review of Systems   All other systems reviewed and are negative          Objective:      /78 (BP Location: Left arm, Patient Position: Sitting)   Ht 5' 5" (1 651 m)   Wt 61 7 kg (136 lb)   BMI 22 63 kg/m²          Physical Exam Constitutional: She appears well-developed and well-nourished  negative

## 2018-06-01 ENCOUNTER — INPATIENT (INPATIENT)
Facility: HOSPITAL | Age: 28
LOS: 4 days | DRG: 870 | End: 2018-06-06
Attending: INTERNAL MEDICINE | Admitting: INTERNAL MEDICINE
Payer: COMMERCIAL

## 2018-06-01 VITALS
HEART RATE: 159 BPM | RESPIRATION RATE: 26 BRPM | OXYGEN SATURATION: 96 % | DIASTOLIC BLOOD PRESSURE: 120 MMHG | SYSTOLIC BLOOD PRESSURE: 157 MMHG

## 2018-06-01 DIAGNOSIS — I46.9 CARDIAC ARREST, CAUSE UNSPECIFIED: ICD-10-CM

## 2018-06-01 LAB
ALBUMIN SERPL ELPH-MCNC: 2.8 G/DL — LOW (ref 3.3–5)
ALBUMIN SERPL ELPH-MCNC: 3.5 G/DL — SIGNIFICANT CHANGE UP (ref 3.3–5)
ALBUMIN SERPL ELPH-MCNC: 3.5 G/DL — SIGNIFICANT CHANGE UP (ref 3.3–5)
ALP SERPL-CCNC: 119 U/L — SIGNIFICANT CHANGE UP (ref 40–120)
ALP SERPL-CCNC: 122 U/L — HIGH (ref 40–120)
ALP SERPL-CCNC: 85 U/L — SIGNIFICANT CHANGE UP (ref 40–120)
ALT FLD-CCNC: 69 U/L — HIGH (ref 10–45)
ALT FLD-CCNC: 86 U/L — HIGH (ref 10–45)
ALT FLD-CCNC: 91 U/L — HIGH (ref 10–45)
ANION GAP SERPL CALC-SCNC: 16 MMOL/L — SIGNIFICANT CHANGE UP (ref 5–17)
ANION GAP SERPL CALC-SCNC: 21 MMOL/L — HIGH (ref 5–17)
ANION GAP SERPL CALC-SCNC: 25 MMOL/L — HIGH (ref 5–17)
APPEARANCE UR: ABNORMAL
APTT BLD: 43.5 SEC — HIGH (ref 27.5–37.4)
APTT BLD: 66.8 SEC — HIGH (ref 27.5–37.4)
AST SERPL-CCNC: 115 U/L — HIGH (ref 10–40)
AST SERPL-CCNC: 117 U/L — HIGH (ref 10–40)
AST SERPL-CCNC: 92 U/L — HIGH (ref 10–40)
BACTERIA # UR AUTO: ABNORMAL /HPF
BASE EXCESS BLDV CALC-SCNC: -16.3 MMOL/L — LOW (ref -2–2)
BASE EXCESS BLDV CALC-SCNC: -5.2 MMOL/L — LOW (ref -2–2)
BASOPHILS # BLD AUTO: 0.3 K/UL — HIGH (ref 0–0.2)
BASOPHILS NFR BLD AUTO: 0 % — SIGNIFICANT CHANGE UP (ref 0–2)
BILIRUB SERPL-MCNC: 0.2 MG/DL — SIGNIFICANT CHANGE UP (ref 0.2–1.2)
BILIRUB UR-MCNC: NEGATIVE — SIGNIFICANT CHANGE UP
BLD GP AB SCN SERPL QL: NEGATIVE — SIGNIFICANT CHANGE UP
BUN SERPL-MCNC: 13 MG/DL — SIGNIFICANT CHANGE UP (ref 7–23)
BUN SERPL-MCNC: 16 MG/DL — SIGNIFICANT CHANGE UP (ref 7–23)
BUN SERPL-MCNC: 18 MG/DL — SIGNIFICANT CHANGE UP (ref 7–23)
CA-I SERPL-SCNC: 1.18 MMOL/L — SIGNIFICANT CHANGE UP (ref 1.12–1.3)
CA-I SERPL-SCNC: 1.24 MMOL/L — SIGNIFICANT CHANGE UP (ref 1.12–1.3)
CALCIUM SERPL-MCNC: 8.5 MG/DL — SIGNIFICANT CHANGE UP (ref 8.4–10.5)
CALCIUM SERPL-MCNC: 9 MG/DL — SIGNIFICANT CHANGE UP (ref 8.4–10.5)
CALCIUM SERPL-MCNC: 9.2 MG/DL — SIGNIFICANT CHANGE UP (ref 8.4–10.5)
CHLORIDE BLDV-SCNC: 96 MMOL/L — SIGNIFICANT CHANGE UP (ref 96–108)
CHLORIDE BLDV-SCNC: 97 MMOL/L — SIGNIFICANT CHANGE UP (ref 96–108)
CHLORIDE SERPL-SCNC: 86 MMOL/L — LOW (ref 96–108)
CHLORIDE SERPL-SCNC: 89 MMOL/L — LOW (ref 96–108)
CHLORIDE SERPL-SCNC: 91 MMOL/L — LOW (ref 96–108)
CK MB BLD-MCNC: 4.4 % — HIGH (ref 0–3.5)
CK MB CFR SERPL CALC: 4.8 NG/ML — SIGNIFICANT CHANGE UP (ref 0–6.7)
CK SERPL-CCNC: 109 U/L — SIGNIFICANT CHANGE UP (ref 30–200)
CK SERPL-CCNC: 153 U/L — SIGNIFICANT CHANGE UP (ref 30–200)
CO2 BLDV-SCNC: 16 MMOL/L — LOW (ref 22–30)
CO2 BLDV-SCNC: 23 MMOL/L — SIGNIFICANT CHANGE UP (ref 22–30)
CO2 SERPL-SCNC: 13 MMOL/L — LOW (ref 22–31)
CO2 SERPL-SCNC: 16 MMOL/L — LOW (ref 22–31)
CO2 SERPL-SCNC: 16 MMOL/L — LOW (ref 22–31)
COLOR SPEC: YELLOW — SIGNIFICANT CHANGE UP
COMMENT - URINE: SIGNIFICANT CHANGE UP
CREAT SERPL-MCNC: 0.48 MG/DL — LOW (ref 0.5–1.3)
CREAT SERPL-MCNC: 0.72 MG/DL — SIGNIFICANT CHANGE UP (ref 0.5–1.3)
CREAT SERPL-MCNC: 0.79 MG/DL — SIGNIFICANT CHANGE UP (ref 0.5–1.3)
DIFF PNL FLD: NEGATIVE — SIGNIFICANT CHANGE UP
EOSINOPHIL # BLD AUTO: 0.1 K/UL — SIGNIFICANT CHANGE UP (ref 0–0.5)
EOSINOPHIL NFR BLD AUTO: 0 % — SIGNIFICANT CHANGE UP (ref 0–6)
EPI CELLS # UR: SIGNIFICANT CHANGE UP /HPF
GAS PNL BLDA: SIGNIFICANT CHANGE UP
GAS PNL BLDA: SIGNIFICANT CHANGE UP
GAS PNL BLDV: 121 MMOL/L — LOW (ref 136–145)
GAS PNL BLDV: 124 MMOL/L — LOW (ref 136–145)
GAS PNL BLDV: SIGNIFICANT CHANGE UP
GLUCOSE BLDC GLUCOMTR-MCNC: 171 MG/DL — HIGH (ref 70–99)
GLUCOSE BLDC GLUCOMTR-MCNC: 289 MG/DL — HIGH (ref 70–99)
GLUCOSE BLDC GLUCOMTR-MCNC: 356 MG/DL — HIGH (ref 70–99)
GLUCOSE BLDV-MCNC: 307 MG/DL — HIGH (ref 70–99)
GLUCOSE BLDV-MCNC: 359 MG/DL — HIGH (ref 70–99)
GLUCOSE SERPL-MCNC: 311 MG/DL — HIGH (ref 70–99)
GLUCOSE SERPL-MCNC: 325 MG/DL — HIGH (ref 70–99)
GLUCOSE SERPL-MCNC: 359 MG/DL — HIGH (ref 70–99)
GLUCOSE UR QL: 1000 MG/DL
HCO3 BLDV-SCNC: 14 MMOL/L — LOW (ref 21–29)
HCO3 BLDV-SCNC: 21 MMOL/L — SIGNIFICANT CHANGE UP (ref 21–29)
HCT VFR BLD CALC: 48.7 % — SIGNIFICANT CHANGE UP (ref 39–50)
HCT VFR BLD CALC: 50.6 % — HIGH (ref 39–50)
HCT VFR BLDA CALC: 44 % — SIGNIFICANT CHANGE UP (ref 39–50)
HCT VFR BLDA CALC: 47 % — SIGNIFICANT CHANGE UP (ref 39–50)
HGB BLD CALC-MCNC: 14.4 G/DL — SIGNIFICANT CHANGE UP (ref 13–17)
HGB BLD CALC-MCNC: 15.2 G/DL — SIGNIFICANT CHANGE UP (ref 13–17)
HGB BLD-MCNC: 15.4 G/DL — SIGNIFICANT CHANGE UP (ref 13–17)
HGB BLD-MCNC: 16.1 G/DL — SIGNIFICANT CHANGE UP (ref 13–17)
HOROWITZ INDEX BLDV+IHG-RTO: 40 — SIGNIFICANT CHANGE UP
INR BLD: 1.6 RATIO — HIGH (ref 0.88–1.16)
INR BLD: 1.67 RATIO — HIGH (ref 0.88–1.16)
KETONES UR-MCNC: ABNORMAL
LACTATE BLDV-MCNC: 4.6 MMOL/L — CRITICAL HIGH (ref 0.7–2)
LACTATE BLDV-MCNC: 9.7 MMOL/L — CRITICAL HIGH (ref 0.7–2)
LEUKOCYTE ESTERASE UR-ACNC: NEGATIVE — SIGNIFICANT CHANGE UP
LYMPHOCYTES # BLD AUTO: 16 % — SIGNIFICANT CHANGE UP (ref 13–44)
LYMPHOCYTES # BLD AUTO: 6.1 K/UL — HIGH (ref 1–3.3)
MAGNESIUM SERPL-MCNC: 1.7 MG/DL — SIGNIFICANT CHANGE UP (ref 1.6–2.6)
MAGNESIUM SERPL-MCNC: 1.9 MG/DL — SIGNIFICANT CHANGE UP (ref 1.6–2.6)
MAGNESIUM SERPL-MCNC: 2.4 MG/DL — SIGNIFICANT CHANGE UP (ref 1.6–2.6)
MCHC RBC-ENTMCNC: 30 PG — SIGNIFICANT CHANGE UP (ref 27–34)
MCHC RBC-ENTMCNC: 30.2 PG — SIGNIFICANT CHANGE UP (ref 27–34)
MCHC RBC-ENTMCNC: 31.6 GM/DL — LOW (ref 32–36)
MCHC RBC-ENTMCNC: 31.8 GM/DL — LOW (ref 32–36)
MCV RBC AUTO: 94.4 FL — SIGNIFICANT CHANGE UP (ref 80–100)
MCV RBC AUTO: 95.4 FL — SIGNIFICANT CHANGE UP (ref 80–100)
METAMYELOCYTES # FLD: 3 % — HIGH (ref 0–0)
MONOCYTES # BLD AUTO: 6.1 K/UL — HIGH (ref 0–0.9)
MONOCYTES NFR BLD AUTO: 15 % — HIGH (ref 2–14)
NEUTROPHILS # BLD AUTO: 33.6 K/UL — HIGH (ref 1.8–7.4)
NEUTROPHILS NFR BLD AUTO: 49 % — SIGNIFICANT CHANGE UP (ref 43–77)
NEUTS BAND # BLD: 13 % — HIGH (ref 0–8)
NITRITE UR-MCNC: NEGATIVE — SIGNIFICANT CHANGE UP
NT-PROBNP SERPL-SCNC: 3975 PG/ML — HIGH (ref 0–300)
OSMOLALITY SERPL: 273 MOS/KG — LOW (ref 275–300)
OTHER CELLS CSF MANUAL: 13 ML/DL — LOW (ref 18–22)
OTHER CELLS CSF MANUAL: 20 ML/DL — SIGNIFICANT CHANGE UP (ref 18–22)
PCO2 BLDV: 46 MMHG — SIGNIFICANT CHANGE UP (ref 35–50)
PCO2 BLDV: 50 MMHG — SIGNIFICANT CHANGE UP (ref 35–50)
PH BLDV: 7.08 — CRITICAL LOW (ref 7.35–7.45)
PH BLDV: 7.28 — LOW (ref 7.35–7.45)
PH UR: 6 — SIGNIFICANT CHANGE UP (ref 5–8)
PHOSPHATE SERPL-MCNC: 1.8 MG/DL — LOW (ref 2.5–4.5)
PHOSPHATE SERPL-MCNC: 4.3 MG/DL — SIGNIFICANT CHANGE UP (ref 2.5–4.5)
PHOSPHATE SERPL-MCNC: 7.7 MG/DL — HIGH (ref 2.5–4.5)
PLAT MORPH BLD: NORMAL — SIGNIFICANT CHANGE UP
PLATELET # BLD AUTO: 305 K/UL — SIGNIFICANT CHANGE UP (ref 150–400)
PLATELET # BLD AUTO: 331 K/UL — SIGNIFICANT CHANGE UP (ref 150–400)
PO2 BLDV: 37 MMHG — SIGNIFICANT CHANGE UP (ref 25–45)
PO2 BLDV: 87 MMHG — HIGH (ref 25–45)
POTASSIUM BLDV-SCNC: 3.7 MMOL/L — SIGNIFICANT CHANGE UP (ref 3.5–5)
POTASSIUM BLDV-SCNC: 4.7 MMOL/L — SIGNIFICANT CHANGE UP (ref 3.5–5)
POTASSIUM SERPL-MCNC: 4.4 MMOL/L — SIGNIFICANT CHANGE UP (ref 3.5–5.3)
POTASSIUM SERPL-MCNC: 4.4 MMOL/L — SIGNIFICANT CHANGE UP (ref 3.5–5.3)
POTASSIUM SERPL-MCNC: 5.2 MMOL/L — SIGNIFICANT CHANGE UP (ref 3.5–5.3)
POTASSIUM SERPL-SCNC: 4.4 MMOL/L — SIGNIFICANT CHANGE UP (ref 3.5–5.3)
POTASSIUM SERPL-SCNC: 4.4 MMOL/L — SIGNIFICANT CHANGE UP (ref 3.5–5.3)
POTASSIUM SERPL-SCNC: 5.2 MMOL/L — SIGNIFICANT CHANGE UP (ref 3.5–5.3)
PROT SERPL-MCNC: 5 G/DL — LOW (ref 6–8.3)
PROT SERPL-MCNC: 6.6 G/DL — SIGNIFICANT CHANGE UP (ref 6–8.3)
PROT SERPL-MCNC: 6.9 G/DL — SIGNIFICANT CHANGE UP (ref 6–8.3)
PROT UR-MCNC: 150 MG/DL
PROTHROM AB SERPL-ACNC: 17.5 SEC — HIGH (ref 9.8–12.7)
PROTHROM AB SERPL-ACNC: 18.2 SEC — HIGH (ref 9.8–12.7)
RAPID RVP RESULT: DETECTED
RBC # BLD: 5.11 M/UL — SIGNIFICANT CHANGE UP (ref 4.2–5.8)
RBC # BLD: 5.36 M/UL — SIGNIFICANT CHANGE UP (ref 4.2–5.8)
RBC # FLD: 12.2 % — SIGNIFICANT CHANGE UP (ref 10.3–14.5)
RBC # FLD: 12.2 % — SIGNIFICANT CHANGE UP (ref 10.3–14.5)
RBC BLD AUTO: SIGNIFICANT CHANGE UP
RBC CASTS # UR COMP ASSIST: SIGNIFICANT CHANGE UP /HPF (ref 0–2)
RH IG SCN BLD-IMP: POSITIVE — SIGNIFICANT CHANGE UP
RV+EV RNA SPEC QL NAA+PROBE: DETECTED
SAO2 % BLDV: 67 % — SIGNIFICANT CHANGE UP (ref 67–88)
SAO2 % BLDV: 93 % — HIGH (ref 67–88)
SMUDGE CELLS # BLD: PRESENT — SIGNIFICANT CHANGE UP
SODIUM SERPL-SCNC: 123 MMOL/L — LOW (ref 135–145)
SODIUM SERPL-SCNC: 124 MMOL/L — LOW (ref 135–145)
SODIUM SERPL-SCNC: 126 MMOL/L — LOW (ref 135–145)
SP GR SPEC: 1.02 — SIGNIFICANT CHANGE UP (ref 1.01–1.02)
TROPONIN T SERPL-MCNC: 0.07 NG/ML — HIGH (ref 0–0.06)
UROBILINOGEN FLD QL: 1 MG/DL
VALPROATE SERPL-MCNC: 93 UG/ML — SIGNIFICANT CHANGE UP (ref 50–100)
VARIANT LYMPHS # BLD: 4 % — SIGNIFICANT CHANGE UP (ref 0–6)
WBC # BLD: 43.7 K/UL — CRITICAL HIGH (ref 3.8–10.5)
WBC # BLD: 46.1 K/UL — CRITICAL HIGH (ref 3.8–10.5)
WBC # FLD AUTO: 43.7 K/UL — CRITICAL HIGH (ref 3.8–10.5)
WBC # FLD AUTO: 46.1 K/UL — CRITICAL HIGH (ref 3.8–10.5)
WBC UR QL: SIGNIFICANT CHANGE UP /HPF (ref 0–5)

## 2018-06-01 PROCEDURE — 95819 EEG AWAKE AND ASLEEP: CPT | Mod: 26

## 2018-06-01 PROCEDURE — 71250 CT THORAX DX C-: CPT | Mod: 26

## 2018-06-01 PROCEDURE — 36556 INSERT NON-TUNNEL CV CATH: CPT

## 2018-06-01 PROCEDURE — 36680 INSERT NEEDLE BONE CAVITY: CPT

## 2018-06-01 PROCEDURE — 71045 X-RAY EXAM CHEST 1 VIEW: CPT | Mod: 26,76

## 2018-06-01 PROCEDURE — 99291 CRITICAL CARE FIRST HOUR: CPT

## 2018-06-01 PROCEDURE — 31500 INSERT EMERGENCY AIRWAY: CPT

## 2018-06-01 PROCEDURE — 49465 FLUORO EXAM OF G/COLON TUBE: CPT

## 2018-06-01 PROCEDURE — 70450 CT HEAD/BRAIN W/O DYE: CPT | Mod: 26

## 2018-06-01 PROCEDURE — 99291 CRITICAL CARE FIRST HOUR: CPT | Mod: 25

## 2018-06-01 RX ORDER — INSULIN HUMAN 100 [IU]/ML
10 INJECTION, SOLUTION SUBCUTANEOUS ONCE
Qty: 0 | Refills: 0 | Status: COMPLETED | OUTPATIENT
Start: 2018-06-01 | End: 2018-06-01

## 2018-06-01 RX ORDER — VANCOMYCIN HCL 1 G
1000 VIAL (EA) INTRAVENOUS ONCE
Qty: 0 | Refills: 0 | Status: COMPLETED | OUTPATIENT
Start: 2018-06-01 | End: 2018-06-01

## 2018-06-01 RX ORDER — EPINEPHRINE 0.3 MG/.3ML
1 INJECTION INTRAMUSCULAR; SUBCUTANEOUS ONCE
Qty: 0 | Refills: 0 | Status: COMPLETED | OUTPATIENT
Start: 2018-06-01 | End: 2018-06-01

## 2018-06-01 RX ORDER — HYDROCORTISONE 20 MG
100 TABLET ORAL ONCE
Qty: 0 | Refills: 0 | Status: COMPLETED | OUTPATIENT
Start: 2018-06-01 | End: 2018-06-01

## 2018-06-01 RX ORDER — SODIUM CHLORIDE 9 MG/ML
1000 INJECTION, SOLUTION INTRAVENOUS ONCE
Qty: 0 | Refills: 0 | Status: COMPLETED | OUTPATIENT
Start: 2018-06-01 | End: 2018-06-01

## 2018-06-01 RX ORDER — THIAMINE MONONITRATE (VIT B1) 100 MG
200 TABLET ORAL
Qty: 0 | Refills: 0 | Status: DISCONTINUED | OUTPATIENT
Start: 2018-06-01 | End: 2018-06-01

## 2018-06-01 RX ORDER — HYDROCORTISONE 20 MG
50 TABLET ORAL EVERY 6 HOURS
Qty: 0 | Refills: 0 | Status: DISCONTINUED | OUTPATIENT
Start: 2018-06-01 | End: 2018-06-01

## 2018-06-01 RX ORDER — HYDROCORTISONE 20 MG
100 TABLET ORAL EVERY 8 HOURS
Qty: 0 | Refills: 0 | Status: DISCONTINUED | OUTPATIENT
Start: 2018-06-01 | End: 2018-06-01

## 2018-06-01 RX ORDER — SODIUM CHLORIDE 9 MG/ML
1000 INJECTION, SOLUTION INTRAVENOUS ONCE
Qty: 0 | Refills: 0 | Status: DISCONTINUED | OUTPATIENT
Start: 2018-06-01 | End: 2018-06-02

## 2018-06-01 RX ORDER — PANTOPRAZOLE SODIUM 20 MG/1
8 TABLET, DELAYED RELEASE ORAL
Qty: 80 | Refills: 0 | Status: DISCONTINUED | OUTPATIENT
Start: 2018-06-01 | End: 2018-06-01

## 2018-06-01 RX ORDER — NOREPINEPHRINE BITARTRATE/D5W 8 MG/250ML
0.05 PLASTIC BAG, INJECTION (ML) INTRAVENOUS
Qty: 8 | Refills: 0 | Status: DISCONTINUED | OUTPATIENT
Start: 2018-06-01 | End: 2018-06-02

## 2018-06-01 RX ORDER — PROPOFOL 10 MG/ML
25 INJECTION, EMULSION INTRAVENOUS
Qty: 1000 | Refills: 0 | Status: DISCONTINUED | OUTPATIENT
Start: 2018-06-01 | End: 2018-06-03

## 2018-06-01 RX ORDER — MONTELUKAST 4 MG/1
10 TABLET, CHEWABLE ORAL DAILY
Qty: 0 | Refills: 0 | Status: DISCONTINUED | OUTPATIENT
Start: 2018-06-01 | End: 2018-06-05

## 2018-06-01 RX ORDER — MAGNESIUM SULFATE 500 MG/ML
2 VIAL (ML) INJECTION ONCE
Qty: 0 | Refills: 0 | Status: COMPLETED | OUTPATIENT
Start: 2018-06-01 | End: 2018-06-01

## 2018-06-01 RX ORDER — AZITHROMYCIN 500 MG/1
500 TABLET, FILM COATED ORAL EVERY 24 HOURS
Qty: 0 | Refills: 0 | Status: DISCONTINUED | OUTPATIENT
Start: 2018-06-01 | End: 2018-06-02

## 2018-06-01 RX ORDER — SODIUM CHLORIDE 9 MG/ML
2000 INJECTION INTRAMUSCULAR; INTRAVENOUS; SUBCUTANEOUS ONCE
Qty: 0 | Refills: 0 | Status: COMPLETED | OUTPATIENT
Start: 2018-06-01 | End: 2018-06-01

## 2018-06-01 RX ORDER — INSULIN HUMAN 100 [IU]/ML
10 INJECTION, SOLUTION SUBCUTANEOUS ONCE
Qty: 0 | Refills: 0 | Status: DISCONTINUED | OUTPATIENT
Start: 2018-06-01 | End: 2018-06-01

## 2018-06-01 RX ORDER — PHENYLEPHRINE HYDROCHLORIDE 10 MG/ML
0.04 INJECTION INTRAVENOUS
Qty: 40 | Refills: 0 | Status: DISCONTINUED | OUTPATIENT
Start: 2018-06-01 | End: 2018-06-03

## 2018-06-01 RX ORDER — VANCOMYCIN HCL 1 G
1000 VIAL (EA) INTRAVENOUS EVERY 12 HOURS
Qty: 0 | Refills: 0 | Status: DISCONTINUED | OUTPATIENT
Start: 2018-06-01 | End: 2018-06-03

## 2018-06-01 RX ORDER — VANCOMYCIN HCL 1 G
1000 VIAL (EA) INTRAVENOUS EVERY 12 HOURS
Qty: 0 | Refills: 0 | Status: DISCONTINUED | OUTPATIENT
Start: 2018-06-01 | End: 2018-06-01

## 2018-06-01 RX ORDER — PIPERACILLIN AND TAZOBACTAM 4; .5 G/20ML; G/20ML
3.38 INJECTION, POWDER, LYOPHILIZED, FOR SOLUTION INTRAVENOUS EVERY 8 HOURS
Qty: 0 | Refills: 0 | Status: DISCONTINUED | OUTPATIENT
Start: 2018-06-01 | End: 2018-06-06

## 2018-06-01 RX ORDER — HYDROCORTISONE 20 MG
100 TABLET ORAL EVERY 8 HOURS
Qty: 0 | Refills: 0 | Status: DISCONTINUED | OUTPATIENT
Start: 2018-06-01 | End: 2018-06-03

## 2018-06-01 RX ORDER — INSULIN LISPRO 100/ML
VIAL (ML) SUBCUTANEOUS
Qty: 0 | Refills: 0 | Status: DISCONTINUED | OUTPATIENT
Start: 2018-06-01 | End: 2018-06-01

## 2018-06-01 RX ORDER — VALPROIC ACID (AS SODIUM SALT) 250 MG/5ML
750 SOLUTION, ORAL ORAL EVERY 8 HOURS
Qty: 0 | Refills: 0 | Status: DISCONTINUED | OUTPATIENT
Start: 2018-06-01 | End: 2018-06-06

## 2018-06-01 RX ORDER — PROPOFOL 10 MG/ML
20 INJECTION, EMULSION INTRAVENOUS ONCE
Qty: 0 | Refills: 0 | Status: COMPLETED | OUTPATIENT
Start: 2018-06-01 | End: 2018-06-01

## 2018-06-01 RX ORDER — IPRATROPIUM/ALBUTEROL SULFATE 18-103MCG
3 AEROSOL WITH ADAPTER (GRAM) INHALATION ONCE
Qty: 0 | Refills: 0 | Status: COMPLETED | OUTPATIENT
Start: 2018-06-01 | End: 2018-06-01

## 2018-06-01 RX ORDER — PANTOPRAZOLE SODIUM 20 MG/1
40 TABLET, DELAYED RELEASE ORAL ONCE
Qty: 0 | Refills: 0 | Status: DISCONTINUED | OUTPATIENT
Start: 2018-06-01 | End: 2018-06-01

## 2018-06-01 RX ORDER — PIPERACILLIN AND TAZOBACTAM 4; .5 G/20ML; G/20ML
3.38 INJECTION, POWDER, LYOPHILIZED, FOR SOLUTION INTRAVENOUS ONCE
Qty: 0 | Refills: 0 | Status: COMPLETED | OUTPATIENT
Start: 2018-06-01 | End: 2018-06-01

## 2018-06-01 RX ORDER — ASCORBIC ACID 60 MG
1500 TABLET,CHEWABLE ORAL EVERY 6 HOURS
Qty: 0 | Refills: 0 | Status: DISCONTINUED | OUTPATIENT
Start: 2018-06-01 | End: 2018-06-01

## 2018-06-01 RX ORDER — INSULIN LISPRO 100/ML
VIAL (ML) SUBCUTANEOUS EVERY 6 HOURS
Qty: 0 | Refills: 0 | Status: DISCONTINUED | OUTPATIENT
Start: 2018-06-01 | End: 2018-06-06

## 2018-06-01 RX ORDER — ASCORBIC ACID 60 MG
1500 TABLET,CHEWABLE ORAL
Qty: 0 | Refills: 0 | Status: DISCONTINUED | OUTPATIENT
Start: 2018-06-01 | End: 2018-06-01

## 2018-06-01 RX ORDER — AZITHROMYCIN 500 MG/1
500 TABLET, FILM COATED ORAL ONCE
Qty: 0 | Refills: 0 | Status: COMPLETED | OUTPATIENT
Start: 2018-06-01 | End: 2018-06-01

## 2018-06-01 RX ORDER — PRIMIDONE 250 MG/1
100 TABLET ORAL EVERY 12 HOURS
Qty: 0 | Refills: 0 | Status: DISCONTINUED | OUTPATIENT
Start: 2018-06-01 | End: 2018-06-06

## 2018-06-01 RX ADMIN — Medication 3: at 19:03

## 2018-06-01 RX ADMIN — PROPOFOL 7.5 MICROGRAM(S)/KG/MIN: 10 INJECTION, EMULSION INTRAVENOUS at 16:35

## 2018-06-01 RX ADMIN — Medication 250 MILLIGRAM(S): at 14:00

## 2018-06-01 RX ADMIN — PRIMIDONE 100 MILLIGRAM(S): 250 TABLET ORAL at 23:23

## 2018-06-01 RX ADMIN — SODIUM CHLORIDE 2000 MILLILITER(S): 9 INJECTION, SOLUTION INTRAVENOUS at 22:53

## 2018-06-01 RX ADMIN — Medication 50 GRAM(S): at 22:53

## 2018-06-01 RX ADMIN — PROPOFOL 20 MILLIGRAM(S): 10 INJECTION, EMULSION INTRAVENOUS at 13:03

## 2018-06-01 RX ADMIN — INSULIN HUMAN 10 UNIT(S): 100 INJECTION, SOLUTION SUBCUTANEOUS at 17:28

## 2018-06-01 RX ADMIN — MONTELUKAST 10 MILLIGRAM(S): 4 TABLET, CHEWABLE ORAL at 23:23

## 2018-06-01 RX ADMIN — Medication 2 MILLIGRAM(S): at 19:50

## 2018-06-01 RX ADMIN — SODIUM CHLORIDE 1000 MILLILITER(S): 9 INJECTION, SOLUTION INTRAVENOUS at 17:50

## 2018-06-01 RX ADMIN — Medication 100 MILLIGRAM(S): at 12:36

## 2018-06-01 RX ADMIN — Medication 750 MILLIGRAM(S): at 23:25

## 2018-06-01 RX ADMIN — Medication 4.69 MICROGRAM(S)/KG/MIN: at 16:36

## 2018-06-01 RX ADMIN — Medication 2 MILLIGRAM(S): at 13:30

## 2018-06-01 RX ADMIN — Medication 3 MILLILITER(S): at 12:50

## 2018-06-01 RX ADMIN — Medication 1: at 23:45

## 2018-06-01 RX ADMIN — Medication 100 MILLIGRAM(S): at 22:54

## 2018-06-01 RX ADMIN — EPINEPHRINE 1 MILLIGRAM(S): 0.3 INJECTION INTRAMUSCULAR; SUBCUTANEOUS at 12:16

## 2018-06-01 RX ADMIN — Medication 250 MILLIMOLE(S): at 23:26

## 2018-06-01 RX ADMIN — AZITHROMYCIN 250 MILLIGRAM(S): 500 TABLET, FILM COATED ORAL at 18:49

## 2018-06-01 RX ADMIN — PIPERACILLIN AND TAZOBACTAM 200 GRAM(S): 4; .5 INJECTION, POWDER, LYOPHILIZED, FOR SOLUTION INTRAVENOUS at 13:25

## 2018-06-01 RX ADMIN — SODIUM CHLORIDE 4000 MILLILITER(S): 9 INJECTION INTRAMUSCULAR; INTRAVENOUS; SUBCUTANEOUS at 12:19

## 2018-06-01 RX ADMIN — EPINEPHRINE 1 MILLIGRAM(S): 0.3 INJECTION INTRAMUSCULAR; SUBCUTANEOUS at 12:19

## 2018-06-01 NOTE — ED ADULT NURSE REASSESSMENT NOTE - NS ED NURSE REASSESS COMMENT FT1
Pt started on Norepinephrine drip after his BP began to drop. Pt was initially started at 0.01mcg/kg/min.   At 14:10 pt's BP was 80/59. Pt's Norepinephrine drip was then increased to 0.1mcg/kg/min as per Stefanie BOURNE.  14:20- BP 95/75.

## 2018-06-01 NOTE — ED PROCEDURE NOTE - CPROC ED INTRAOSS CONFIRM1
aspiration of blood/marrow mixture without difficulty/flushing with fluid/needle stands without support
needle stands without support/aspiration of blood/marrow mixture without difficulty/flushing with fluid

## 2018-06-01 NOTE — ED ADULT NURSE REASSESSMENT NOTE - NS ED NURSE REASSESS COMMENT FT1
12:13- Pt had no pulse, CPR started.  12:15- asytole 12:16-epi  12:17-asystole 12:19- epi  12:20- ROSC 12:22- 276 BG  12:36- Hydrocortisone 100mg 12:47- Propofol started at 20mcg/kg/min  12:56- Propofol increased to 25mcg/kg/min 13:03- Jim BOURNE gave additional push of Propofol 20mg.

## 2018-06-01 NOTE — H&P ADULT - ATTENDING COMMENTS
27 year old man with MRCP non verbal at baseline and wheelchair bound brought in to ED by parents after 2-3 days of upper respiratory illness with increased wob, fever and congestion. Upon arrival to the ED patient was found to be pulseless and unresponsive. PEA arrest 7 minutes to ROSC. Patient was intubated and is requiring vasopressor support. Has a history of asthma and seizures.    - intubated on propofol for vent coordination and comfort  - reported he was moving around and bucking the vent after ROSC  - please restart home AED  - multifocal pneumonia on CT   - continue broad spectrum antibiotics follow up cultures  - elevated LFTS likely shocked liver will follow  - monitor I's and O's  - acidemia and lactate improving  - continue vasopressor support    critical care time 35 minutes

## 2018-06-01 NOTE — ED PROVIDER NOTE - CRITICAL CARE PROVIDED
direct patient care (not related to procedure)/consultation with other physicians/documentation/interpretation of diagnostic studies/consult w/ pt's family directly relating to pts condition/additional history taking/conducted a detailed discussion of DNR status

## 2018-06-01 NOTE — ED PROCEDURE NOTE - CPROC ED INDICATIONS1
cardiac arrest
cardiac arrest/no peripheral access
respiratory failure/airway protection/in cardiac arrest
emergency venous access

## 2018-06-01 NOTE — ED PROVIDER NOTE - ATTENDING CONTRIBUTION TO CARE
Dr. Fernandez (Attending Physician)  I performed a history and physical exam of the patient and discussed their management with the resident. I reviewed the resident's note and agree with the documented findings and plan of care. My medical decision making and observations are found above.

## 2018-06-01 NOTE — H&P ADULT - HISTORY OF PRESENT ILLNESS
27 yr old male PMHx of MRCP, non verbal, wheel chair bound, Asthma (reactive airway dz), Seizure disorder, scoliosis, aspiration pmn in past, recent peg replacement  april/2018  who presents to ED brought in by parents from home with progressive/worsening  resp distress over two day period. Parents endorse pt developed URI viral symptoms two days prior to presentation (5/30) with  increased of WOB, home SPO2 check demonstrated sats of 98 -99%. sx accompanied by fever of 100.8 treated with tylenol and motrin, albuterol and budenoside nebulizer tx's, frequent chest PT and  Placed on amoxicillin (pt pediatrician). Pt with progressive resp distress over ensuing days with increased mucus production and  ineffective cough with increasing oxygen needs to 4 to 5 liters N/C (parents endorse home O2 with rare use). In E.D. at triage pt found to be unresponsive,   pulseless. CPR/ACLS protocol initiated, intubated. initial cardiac rhythm of asystole to PEA, received total 2 epi with ROSC within 7 minutes (1213pm to 1220pm). Pt began to vera ventilator and demonstrated spontaneous movement of RUE, as endorsed by E.D. staff. SBP of 85/46,   Ph 7.08, lactic acid 9.7,   Pt received 2 liters NS, 1 liter LR, placed on norepi gtt, hydrocortisone 100 mg IVP, albuterol 1 unit dose neb, propofol gtt. In addition started on zithromax, vanco, zosyn   Pt admitted to MICU 2/2 to hypoxic/cardiac resp arrest secondary to possible aspiration pmn and septic shock 2/2 to possible asp pmn 27 yr old male PMHx of MRCP, non verbal, wheel chair bound, Asthma (reactive airway dz), Seizure disorder, scoliosis, aspiration pna in past, recent PEG replacement  april/2018 brought in to ED by parents from home with progressive/worsening  resp distress over two day period. Parents endorse pt developed URI viral symptoms two days prior to presentation (5/30) with  increased of WOB, home SPO2 check demonstrated sats of 98 -99%. sx accompanied by fever of 100.8 treated with tylenol and motrin, albuterol and budenoside nebulizer tx's, frequent chest PT and  Placed on amoxicillin (pt pediatrician). Pt with progressive resp distress over ensuing days with increased mucus production and  ineffective cough with increasing oxygen needs to 4 to 5 liters N/C (parents endorse home O2 with rare use). In E.D. at triage pt found to be unresponsive,   pulseless. CPR/ACLS protocol initiated, intubated. initial cardiac rhythm of asystole to PEA, received total 2 epi with ROSC within 7 minutes (1213pm to 1220pm). Pt began to vera ventilator and demonstrated spontaneous movement of RUE, as endorsed by E.D. staff. SBP of 85/46,   Ph 7.08, lactic acid 9.7,   Pt received 2 liters NS, 1 liter LR, placed on norepi gtt, hydrocortisone 100 mg IVP, albuterol 1 unit dose neb, propofol gtt. In addition started on zithromax, vanco, zosyn   Pt admitted to MICU 2/2 to hypoxic/cardiac resp arrest secondary to possible aspiration pmn and septic shock 2/2 to possible asp pmn

## 2018-06-01 NOTE — ED PROVIDER NOTE - PROGRESS NOTE DETAILS
Luba: pt brought in from waiting room in cardiac arrest.  No pulse, pt put on lucus device, 2*IO's placed. Patient intubated.  ACLS protocol started.  on second pulse check pt found to have a pulse at 160.  resuscitation continued. discussed with family.

## 2018-06-01 NOTE — ED PROCEDURE NOTE - NS ED PROCEDURE ASSISTED BY
Supervision was available

## 2018-06-01 NOTE — ED PROVIDER NOTE - OBJECTIVE STATEMENT
27 year old male past medical history MR, seizure disorder, asthma, presents to the ED unresponsive. Mom reports that he was 27 year old male past medical history MR, seizure disorder, asthma, presents to the ED unresponsive. Mom reports that he was having shortness of breath so they were bringing robbie to the ED. When the patient arrived to the ED patient was unreponsive, in triage patient was noted to not have a pulse.

## 2018-06-01 NOTE — ED ADULT NURSE NOTE - OBJECTIVE STATEMENT
26 y/o M, reported to ED from home with parents. Pt became unresponsive upon arrival to ED. Pt was recognized to have no pulse upon wheeling into the ED at 12:13. Pt was moved from his wheelchair to ED bed and CPR was initiated. Parents report that pt was being treated for respiratory infection at home. Pt was showing to having more difficulty breathing when parents decided to bring him to the ED. Pt was "sleeping in the back of the car until arrival" as per mom. Pt was pale and white in appearance upon arrival to ED. Pt is wheelchair bond at baseline with zain to both extremities. PEG tube in place, pt in diaper for incontinence. According to mother pt has a hx of MR, asthma and pneumonia. Parents at bedside, will continue to monitor pt.

## 2018-06-01 NOTE — EEG REPORT - NS EEG TEXT BOX
[facility]   COMPREHENSIVE EPILEPSY CENTER   REPORT OF ROUTINE VIDEO EEG     [facility]: [address] [place] [address] , [address], [place] [address], Ph#: [phone]  [facility]: [id number], [place] [address], Ph#: [phone]  Office: [address] [place] [address], [facility] [address], [place] [address] Ph#: [phone]    Patient Name: RADHA VILLALPANDO  Age and : 27y ([birth year])  MRN #: [id number]  Location: [facility]   Referring Physician: Susie Whittington    Study Date: 18    _____________________________________________________________  TECHNICAL INFORMATION    Placement and Labeling of Electrodes:  The EEG was performed utilizing 20 channels referential EEG connections (coronal over temporal over parasagittal montage) using all standard 10-20 electrode placements with EKG.  Recording was at a sampling rate of 256 samples per second per channel.  Time synchronized digital video recording was done simultaneously with EEG recording.  A low light infrared camera was used for low light recording.  Eric and seizure detection algorithms were utilized.    _____________________________________________________________  HISTORY    Patient is a 27y old  Male who presents with a chief complaint of sepsis/ cardiac arrest (2018 17:37)      PERTINENT MEDICATION:  propofol Infusion 25 MICROgram(s)/kG/Min (7.5 mL/Hr) IV Continuous <Continuous>  valproic  acid Syrup 750 milliGRAM(s) Oral every 8 hours    _____________________________________________________________  STUDY INTERPRETATION    Findings:  The background was spontaneously variable, non-reactive, and in burst suppression that consisted of 1-3s of diffuse theta/delta alternating with 1-2min of inter-burst interval. No posterior dominant rhythm seen.    Focal Slowing:   None was present.    Sleep Background:  Stage II sleep transients were not recorded.    Other Non-Epileptiform Findings:  None were present.    Interictal Epileptiform Activity:   None were present.    Events:  Clinical events: None recorded.  Seizures: None recorded.    Activation Procedures:   Hyperventilation was not performed.    Photic stimulation was not performed.     Artifacts:  Intermittent myogenic and movement artifacts were noted.    ECG:  The heart rate on single channel ECG was predominantly between 80-90 BPM.    _____________________________________________________________  EEG SUMMARY/CLASSIFICATION    Abnormal EEG in the awake, drowsy, and asleep states due to  - Severe generalized slowing.    _____________________________________________________________  EEG IMPRESSION/CLINICAL CORRELATE    Abnormal EEG study.  1. Severe nonspecific diffuse or multifocal cerebral dysfunction, which may be at least in part due to sedation.  2. No epileptiform pattern or seizure seen.      Coco Gaytan MD  Attending Physician, [facility] Epilepsy [facility]   COMPREHENSIVE EPILEPSY CENTER   REPORT OF ROUTINE VIDEO EEG     [facility]: [address] [place] [address] , [address], [place] [address], Ph#: [phone]  [facility]: [id number], [place] [address], Ph#: [phone]  Office: [address] [place] [address], [facility] [address], [place] [address] Ph#: [phone]    Patient Name: RADHA VILLALPANDO  Age and : 27y (90)  MRN #: [id number]  Location: [facility] 515   Referring Physician: Susie Whittington    Study Date: 18    _____________________________________________________________  TECHNICAL INFORMATION    Placement and Labeling of Electrodes:  The EEG was performed utilizing 20 channels referential EEG connections (coronal over temporal over parasagittal montage) using all standard 10-20 electrode placements with EKG.  Recording was at a sampling rate of 256 samples per second per channel.  Time synchronized digital video recording was done simultaneously with EEG recording.  A low light infrared camera was used for low light recording.  Eric and seizure detection algorithms were utilized.    _____________________________________________________________  HISTORY    Patient is a 27y old  Male who presents with a chief complaint of sepsis/ cardiac arrest (2018 17:37)      PERTINENT MEDICATION:  propofol Infusion 25 MICROgram(s)/kG/Min (7.5 mL/Hr) IV Continuous <Continuous>  valproic  acid Syrup 750 milliGRAM(s) Oral every 8 hours    _____________________________________________________________  STUDY INTERPRETATION    Findings:  The background was spontaneously variable, non-reactive, and in burst suppression that consisted of 1-3s of diffuse theta/delta alternating with 1-2min of inter-burst interval. No posterior dominant rhythm seen.    Focal Slowing:   None was present.    Sleep Background:  Stage II sleep transients were not recorded.    Other Non-Epileptiform Findings:  None were present.    Interictal Epileptiform Activity:   None were present.    Events:  Clinical events: None recorded.  Seizures: None recorded.    Activation Procedures:   Hyperventilation was not performed.    Photic stimulation was not performed.     Artifacts:  Intermittent myogenic and movement artifacts were noted.    ECG:  The heart rate on single channel ECG was predominantly between 80-90 BPM.    _____________________________________________________________  EEG SUMMARY/CLASSIFICATION    Abnormal EEG in the awake, drowsy, and asleep states due to  - Severe generalized slowing.    _____________________________________________________________  EEG IMPRESSION/CLINICAL CORRELATE    Abnormal EEG study.  1. Severe nonspecific diffuse or multifocal cerebral dysfunction, which may be at least in part due to sedation.  2. No epileptiform pattern or seizure seen.      Coco Gaytan MD  Attending Physician, [facility] Epilepsy [place]

## 2018-06-01 NOTE — ED PROCEDURE NOTE - ATTENDING CONTRIBUTION TO CARE
Dr. Fernandez (Attending Physician)  I supervised the above procedure and agree with the documented note.

## 2018-06-01 NOTE — ED PROCEDURE NOTE - CPROC ED INFORMED CONSENT1
This was an emergent procedure and consent was implied.
This was an emergent procedure and consent was implied./and discussed with mother
This was an emergent procedure and consent was implied.

## 2018-06-01 NOTE — ED PROVIDER NOTE - SKIN TEMP
----- Message from Tonny Galeano MD sent at 3/28/2017 12:29 PM CDT -----  Bone density test:Stable osteopenia.  Risk fracture not quite enough to start Fosamax, but it wouldn't be unreasonable to do so if she is interested.      Patient notified of results. States had tried Fosamax in the past. Developed jaw pain and lost teeth while on it.   
dry

## 2018-06-01 NOTE — H&P ADULT - ASSESSMENT
Plan:    #Neuro:  -neuro checks q 2 hrs and prn for changes  -24 EEG to r/o sz activity  -continue with AED- valproic acid  -ativan IVP first line for sz activity  -will need to change to IV form of valproic acid (as peg placement needs to be assessed )  -sz precautions    #Pulm:  -mechanical vent therapy - titrate to maintain ph 7.35-7.45; PCO2 35-45; SPO2 > 92%  -duoneb therapy q 6 hrs  -Pulmicort 0.5 q 12 hr   -CTA chest to eval parenchyma and r/o P.E.  -HOB >/= 30 degree angle  -chest pt q 2 hrs    #CV:  -ECG now and q a.m. x 3  -cardiac enzymes now and q 8 hrs x 3  -obtain TTE to eval RVFx/LVFx and to help r/o PE Pena's sign  -norepinephrine gtt - titrate to MAP >/= 65 mmHg    #GI/:  -pt with peg  -as peg noted to be slightly dislodged during exam - will need gastroview study  -npo at present  -strict I & O's - keep even  -protonix 40 mg IV q.d.    #I.D.:  -pan culture  -obtain RVP  -continue with zithromax 500 mg IV qd x 5 days  -continue zosyn 3.375 gm IV q 8 hrs  -continue vanco 1 gm q 12 hr IV   -titrate vanco to trough of 15 to 20  -hydrocortisone 100 mg IV q 8  -thiamine 200 mg IV q 12 hr   -Vitamin c 1.5 gms IV q 6 hrs    #FEN/ENDO/HEME:  -Na 124 (baseline 134 - 135) s/p arrest, hyperglycemic s/p epi IVP - repeat blood work  -obtain cmp/mg++/po--4/coags/cbc q 12 hrs  -obtain ABG now and prn  -POC glucose q 4 hr with ISS - maintain glucose 140 to 160  -LR @ 100 cc/hr  -DVT prophylaxis - PAS stockings

## 2018-06-01 NOTE — ED PROCEDURE NOTE - CPROC ED INFUS LINE DETAIL1
The location was identified, and the area was draped and prepped./Ultrasound guidance was used during placement./The catheter was placed using sterile technique./All lumen(s) aspirated and flushed without difficulty./The guidewire was recovered.

## 2018-06-01 NOTE — ED PROVIDER NOTE - MEDICAL DECISION MAKING DETAILS
27 year old male past medical history MR, seizure disorder, asthma, presents to the ED unresponsive. Patient with no pulse noted in triage. Cardiac arrest intiated, intubated in ED, See flow sheet for meds given, ROSC obtained, Right IJ central line placed. Mom reports patient with frequent pneumonias, ET tube noted to have thick green secretions concerning for aspiration, so antibiotics started. Patient also with possible severe asthma exacerbation, so given duonebs and steroids. Patient was hypotensive, started on pressors. MICU accepting patient.

## 2018-06-01 NOTE — ED PROCEDURE NOTE - CPROC ED PHYSICIAN PRESENCE1
I was present during the key portion of the procedure.

## 2018-06-02 LAB
ALBUMIN SERPL ELPH-MCNC: 2.9 G/DL — LOW (ref 3.3–5)
ALP SERPL-CCNC: 67 U/L — SIGNIFICANT CHANGE UP (ref 40–120)
ALT FLD-CCNC: 60 U/L — HIGH (ref 10–45)
ANION GAP SERPL CALC-SCNC: 11 MMOL/L — SIGNIFICANT CHANGE UP (ref 5–17)
ANION GAP SERPL CALC-SCNC: 11 MMOL/L — SIGNIFICANT CHANGE UP (ref 5–17)
ANION GAP SERPL CALC-SCNC: 14 MMOL/L — SIGNIFICANT CHANGE UP (ref 5–17)
APTT BLD: 34 SEC — SIGNIFICANT CHANGE UP (ref 27.5–37.4)
AST SERPL-CCNC: 79 U/L — HIGH (ref 10–40)
BASE EXCESS BLDV CALC-SCNC: -0.8 MMOL/L — SIGNIFICANT CHANGE UP (ref -2–2)
BASE EXCESS BLDV CALC-SCNC: 0 MMOL/L — SIGNIFICANT CHANGE UP (ref -2–2)
BILIRUB SERPL-MCNC: 0.2 MG/DL — SIGNIFICANT CHANGE UP (ref 0.2–1.2)
BUN SERPL-MCNC: 6 MG/DL — LOW (ref 7–23)
BUN SERPL-MCNC: 7 MG/DL — SIGNIFICANT CHANGE UP (ref 7–23)
BUN SERPL-MCNC: 7 MG/DL — SIGNIFICANT CHANGE UP (ref 7–23)
CA-I SERPL-SCNC: 1.17 MMOL/L — SIGNIFICANT CHANGE UP (ref 1.12–1.3)
CA-I SERPL-SCNC: 1.17 MMOL/L — SIGNIFICANT CHANGE UP (ref 1.12–1.3)
CALCIUM SERPL-MCNC: 8.3 MG/DL — LOW (ref 8.4–10.5)
CALCIUM SERPL-MCNC: 8.4 MG/DL — SIGNIFICANT CHANGE UP (ref 8.4–10.5)
CALCIUM SERPL-MCNC: 8.5 MG/DL — SIGNIFICANT CHANGE UP (ref 8.4–10.5)
CHLORIDE BLDV-SCNC: 100 MMOL/L — SIGNIFICANT CHANGE UP (ref 96–108)
CHLORIDE BLDV-SCNC: 102 MMOL/L — SIGNIFICANT CHANGE UP (ref 96–108)
CHLORIDE SERPL-SCNC: 96 MMOL/L — SIGNIFICANT CHANGE UP (ref 96–108)
CHLORIDE SERPL-SCNC: 96 MMOL/L — SIGNIFICANT CHANGE UP (ref 96–108)
CHLORIDE SERPL-SCNC: 98 MMOL/L — SIGNIFICANT CHANGE UP (ref 96–108)
CK MB CFR SERPL CALC: 6.6 NG/ML — SIGNIFICANT CHANGE UP (ref 0–6.7)
CK SERPL-CCNC: 47 U/L — SIGNIFICANT CHANGE UP (ref 30–200)
CO2 BLDV-SCNC: 25 MMOL/L — SIGNIFICANT CHANGE UP (ref 22–30)
CO2 BLDV-SCNC: 25 MMOL/L — SIGNIFICANT CHANGE UP (ref 22–30)
CO2 SERPL-SCNC: 19 MMOL/L — LOW (ref 22–31)
CO2 SERPL-SCNC: 20 MMOL/L — LOW (ref 22–31)
CO2 SERPL-SCNC: 25 MMOL/L — SIGNIFICANT CHANGE UP (ref 22–31)
CREAT ?TM UR-MCNC: 39 MG/DL — SIGNIFICANT CHANGE UP
CREAT SERPL-MCNC: <0.3 MG/DL — LOW (ref 0.5–1.3)
CULTURE RESULTS: NO GROWTH — SIGNIFICANT CHANGE UP
GAS PNL BLDA: SIGNIFICANT CHANGE UP
GAS PNL BLDV: 127 MMOL/L — LOW (ref 136–145)
GAS PNL BLDV: 128 MMOL/L — LOW (ref 136–145)
GAS PNL BLDV: SIGNIFICANT CHANGE UP
GLUCOSE BLDC GLUCOMTR-MCNC: 130 MG/DL — HIGH (ref 70–99)
GLUCOSE BLDC GLUCOMTR-MCNC: 150 MG/DL — HIGH (ref 70–99)
GLUCOSE BLDC GLUCOMTR-MCNC: 153 MG/DL — HIGH (ref 70–99)
GLUCOSE BLDV-MCNC: 150 MG/DL — HIGH (ref 70–99)
GLUCOSE BLDV-MCNC: 188 MG/DL — HIGH (ref 70–99)
GLUCOSE SERPL-MCNC: 153 MG/DL — HIGH (ref 70–99)
GLUCOSE SERPL-MCNC: 168 MG/DL — HIGH (ref 70–99)
GLUCOSE SERPL-MCNC: 179 MG/DL — HIGH (ref 70–99)
GRAM STN FLD: SIGNIFICANT CHANGE UP
HCO3 BLDV-SCNC: 24 MMOL/L — SIGNIFICANT CHANGE UP (ref 21–29)
HCO3 BLDV-SCNC: 24 MMOL/L — SIGNIFICANT CHANGE UP (ref 21–29)
HCT VFR BLD CALC: 43.2 % — SIGNIFICANT CHANGE UP (ref 39–50)
HCT VFR BLDA CALC: 42 % — SIGNIFICANT CHANGE UP (ref 39–50)
HCT VFR BLDA CALC: 44 % — SIGNIFICANT CHANGE UP (ref 39–50)
HGB BLD CALC-MCNC: 13.6 G/DL — SIGNIFICANT CHANGE UP (ref 13–17)
HGB BLD CALC-MCNC: 14.5 G/DL — SIGNIFICANT CHANGE UP (ref 13–17)
HGB BLD-MCNC: 14.8 G/DL — SIGNIFICANT CHANGE UP (ref 13–17)
HOROWITZ INDEX BLDV+IHG-RTO: 40 — SIGNIFICANT CHANGE UP
HOROWITZ INDEX BLDV+IHG-RTO: 40 — SIGNIFICANT CHANGE UP
INR BLD: 1.43 RATIO — HIGH (ref 0.88–1.16)
LACTATE BLDV-MCNC: 1.9 MMOL/L — SIGNIFICANT CHANGE UP (ref 0.7–2)
LACTATE BLDV-MCNC: 2.9 MMOL/L — HIGH (ref 0.7–2)
LEGIONELLA AG UR QL: NEGATIVE — SIGNIFICANT CHANGE UP
MAGNESIUM SERPL-MCNC: 1.9 MG/DL — SIGNIFICANT CHANGE UP (ref 1.6–2.6)
MAGNESIUM SERPL-MCNC: 2.3 MG/DL — SIGNIFICANT CHANGE UP (ref 1.6–2.6)
MAGNESIUM SERPL-MCNC: 2.6 MG/DL — SIGNIFICANT CHANGE UP (ref 1.6–2.6)
MCHC RBC-ENTMCNC: 31.2 PG — SIGNIFICANT CHANGE UP (ref 27–34)
MCHC RBC-ENTMCNC: 34.1 GM/DL — SIGNIFICANT CHANGE UP (ref 32–36)
MCV RBC AUTO: 91.4 FL — SIGNIFICANT CHANGE UP (ref 80–100)
OSMOLALITY UR: 349 MOS/KG — SIGNIFICANT CHANGE UP (ref 300–900)
OTHER CELLS CSF MANUAL: 14 ML/DL — LOW (ref 18–22)
OTHER CELLS CSF MANUAL: 15 ML/DL — LOW (ref 18–22)
PCO2 BLDV: 37 MMHG — SIGNIFICANT CHANGE UP (ref 35–50)
PCO2 BLDV: 41 MMHG — SIGNIFICANT CHANGE UP (ref 35–50)
PH BLDV: 7.38 — SIGNIFICANT CHANGE UP (ref 7.35–7.45)
PH BLDV: 7.42 — SIGNIFICANT CHANGE UP (ref 7.35–7.45)
PHOSPHATE 24H UR-MCNC: 5.9 MG/DL — SIGNIFICANT CHANGE UP
PHOSPHATE SERPL-MCNC: 2.4 MG/DL — LOW (ref 2.5–4.5)
PHOSPHATE SERPL-MCNC: 2.9 MG/DL — SIGNIFICANT CHANGE UP (ref 2.5–4.5)
PHOSPHATE SERPL-MCNC: 4 MG/DL — SIGNIFICANT CHANGE UP (ref 2.5–4.5)
PLATELET # BLD AUTO: 267 K/UL — SIGNIFICANT CHANGE UP (ref 150–400)
PO2 BLDV: 39 MMHG — SIGNIFICANT CHANGE UP (ref 25–45)
PO2 BLDV: 40 MMHG — SIGNIFICANT CHANGE UP (ref 25–45)
POTASSIUM BLDV-SCNC: 3 MMOL/L — LOW (ref 3.5–5)
POTASSIUM BLDV-SCNC: 4.3 MMOL/L — SIGNIFICANT CHANGE UP (ref 3.5–5)
POTASSIUM SERPL-MCNC: 3.4 MMOL/L — LOW (ref 3.5–5.3)
POTASSIUM SERPL-MCNC: 3.8 MMOL/L — SIGNIFICANT CHANGE UP (ref 3.5–5.3)
POTASSIUM SERPL-MCNC: 4.7 MMOL/L — SIGNIFICANT CHANGE UP (ref 3.5–5.3)
POTASSIUM SERPL-SCNC: 3.4 MMOL/L — LOW (ref 3.5–5.3)
POTASSIUM SERPL-SCNC: 3.8 MMOL/L — SIGNIFICANT CHANGE UP (ref 3.5–5.3)
POTASSIUM SERPL-SCNC: 4.7 MMOL/L — SIGNIFICANT CHANGE UP (ref 3.5–5.3)
POTASSIUM UR-SCNC: 29 MMOL/L — SIGNIFICANT CHANGE UP
PROLACTIN SERPL-MCNC: 23.9 NG/ML — HIGH (ref 4.1–18.4)
PROLACTIN SERPL-MCNC: 8.9 NG/ML — SIGNIFICANT CHANGE UP (ref 4.1–18.4)
PROT SERPL-MCNC: 5.6 G/DL — LOW (ref 6–8.3)
PROTHROM AB SERPL-ACNC: 15.6 SEC — HIGH (ref 9.8–12.7)
RBC # BLD: 4.73 M/UL — SIGNIFICANT CHANGE UP (ref 4.2–5.8)
RBC # FLD: 12 % — SIGNIFICANT CHANGE UP (ref 10.3–14.5)
SAO2 % BLDV: 73 % — SIGNIFICANT CHANGE UP (ref 67–88)
SAO2 % BLDV: 75 % — SIGNIFICANT CHANGE UP (ref 67–88)
SODIUM SERPL-SCNC: 128 MMOL/L — LOW (ref 135–145)
SODIUM SERPL-SCNC: 130 MMOL/L — LOW (ref 135–145)
SODIUM SERPL-SCNC: 132 MMOL/L — LOW (ref 135–145)
SODIUM UR-SCNC: 42 MMOL/L — SIGNIFICANT CHANGE UP
SPECIMEN SOURCE: SIGNIFICANT CHANGE UP
SPECIMEN SOURCE: SIGNIFICANT CHANGE UP
TROPONIN T SERPL-MCNC: <0.01 NG/ML — SIGNIFICANT CHANGE UP (ref 0–0.06)
VANCOMYCIN TROUGH SERPL-MCNC: <4 UG/ML — LOW (ref 10–20)
WBC # BLD: 33.8 K/UL — HIGH (ref 3.8–10.5)
WBC # FLD AUTO: 33.8 K/UL — HIGH (ref 3.8–10.5)

## 2018-06-02 PROCEDURE — 95951: CPT | Mod: 26,52

## 2018-06-02 PROCEDURE — 99291 CRITICAL CARE FIRST HOUR: CPT

## 2018-06-02 PROCEDURE — 93010 ELECTROCARDIOGRAM REPORT: CPT

## 2018-06-02 RX ORDER — SENNA PLUS 8.6 MG/1
10 TABLET ORAL AT BEDTIME
Qty: 0 | Refills: 0 | Status: DISCONTINUED | OUTPATIENT
Start: 2018-06-02 | End: 2018-06-06

## 2018-06-02 RX ORDER — POTASSIUM CHLORIDE 20 MEQ
10 PACKET (EA) ORAL
Qty: 0 | Refills: 0 | Status: COMPLETED | OUTPATIENT
Start: 2018-06-02 | End: 2018-06-02

## 2018-06-02 RX ORDER — HEPARIN SODIUM 5000 [USP'U]/ML
5000 INJECTION INTRAVENOUS; SUBCUTANEOUS EVERY 12 HOURS
Qty: 0 | Refills: 0 | Status: DISCONTINUED | OUTPATIENT
Start: 2018-06-02 | End: 2018-06-06

## 2018-06-02 RX ORDER — POLYETHYLENE GLYCOL 3350 17 G/17G
17 POWDER, FOR SOLUTION ORAL
Qty: 0 | Refills: 0 | Status: DISCONTINUED | OUTPATIENT
Start: 2018-06-02 | End: 2018-06-06

## 2018-06-02 RX ORDER — SODIUM CHLORIDE 9 MG/ML
1000 INJECTION, SOLUTION INTRAVENOUS
Qty: 0 | Refills: 0 | Status: DISCONTINUED | OUTPATIENT
Start: 2018-06-02 | End: 2018-06-02

## 2018-06-02 RX ORDER — POTASSIUM CHLORIDE 20 MEQ
40 PACKET (EA) ORAL ONCE
Qty: 0 | Refills: 0 | Status: COMPLETED | OUTPATIENT
Start: 2018-06-02 | End: 2018-06-02

## 2018-06-02 RX ADMIN — Medication 1: at 18:12

## 2018-06-02 RX ADMIN — Medication 1 DROP(S): at 14:29

## 2018-06-02 RX ADMIN — POLYETHYLENE GLYCOL 3350 17 GRAM(S): 17 POWDER, FOR SOLUTION ORAL at 05:01

## 2018-06-02 RX ADMIN — Medication 250 MILLIGRAM(S): at 05:03

## 2018-06-02 RX ADMIN — Medication 750 MILLIGRAM(S): at 14:03

## 2018-06-02 RX ADMIN — PIPERACILLIN AND TAZOBACTAM 25 GRAM(S): 4; .5 INJECTION, POWDER, LYOPHILIZED, FOR SOLUTION INTRAVENOUS at 08:37

## 2018-06-02 RX ADMIN — Medication 40 MILLIEQUIVALENT(S): at 02:30

## 2018-06-02 RX ADMIN — Medication 62.5 MILLIMOLE(S): at 08:39

## 2018-06-02 RX ADMIN — MONTELUKAST 10 MILLIGRAM(S): 4 TABLET, CHEWABLE ORAL at 12:32

## 2018-06-02 RX ADMIN — Medication 250 MILLIGRAM(S): at 19:54

## 2018-06-02 RX ADMIN — PROPOFOL 7.5 MICROGRAM(S)/KG/MIN: 10 INJECTION, EMULSION INTRAVENOUS at 04:18

## 2018-06-02 RX ADMIN — SODIUM CHLORIDE 50 MILLILITER(S): 9 INJECTION, SOLUTION INTRAVENOUS at 04:17

## 2018-06-02 RX ADMIN — PRIMIDONE 100 MILLIGRAM(S): 250 TABLET ORAL at 05:02

## 2018-06-02 RX ADMIN — HEPARIN SODIUM 5000 UNIT(S): 5000 INJECTION INTRAVENOUS; SUBCUTANEOUS at 18:11

## 2018-06-02 RX ADMIN — Medication 100 MILLIGRAM(S): at 05:01

## 2018-06-02 RX ADMIN — PROPOFOL 7.5 MICROGRAM(S)/KG/MIN: 10 INJECTION, EMULSION INTRAVENOUS at 14:03

## 2018-06-02 RX ADMIN — PHENYLEPHRINE HYDROCHLORIDE 0.61 MICROGRAM(S)/KG/MIN: 10 INJECTION INTRAVENOUS at 04:17

## 2018-06-02 RX ADMIN — Medication 100 MILLIGRAM(S): at 22:14

## 2018-06-02 RX ADMIN — PIPERACILLIN AND TAZOBACTAM 25 GRAM(S): 4; .5 INJECTION, POWDER, LYOPHILIZED, FOR SOLUTION INTRAVENOUS at 17:50

## 2018-06-02 RX ADMIN — PROPOFOL 7.5 MICROGRAM(S)/KG/MIN: 10 INJECTION, EMULSION INTRAVENOUS at 08:37

## 2018-06-02 RX ADMIN — Medication 1 DROP(S): at 22:14

## 2018-06-02 RX ADMIN — PRIMIDONE 100 MILLIGRAM(S): 250 TABLET ORAL at 18:11

## 2018-06-02 RX ADMIN — Medication 750 MILLIGRAM(S): at 22:13

## 2018-06-02 RX ADMIN — PIPERACILLIN AND TAZOBACTAM 25 GRAM(S): 4; .5 INJECTION, POWDER, LYOPHILIZED, FOR SOLUTION INTRAVENOUS at 01:26

## 2018-06-02 RX ADMIN — HEPARIN SODIUM 5000 UNIT(S): 5000 INJECTION INTRAVENOUS; SUBCUTANEOUS at 05:01

## 2018-06-02 RX ADMIN — Medication 100 MILLIEQUIVALENT(S): at 02:01

## 2018-06-02 RX ADMIN — Medication 100 MILLIGRAM(S): at 14:01

## 2018-06-02 RX ADMIN — Medication 100 MILLIEQUIVALENT(S): at 03:00

## 2018-06-02 RX ADMIN — Medication 750 MILLIGRAM(S): at 05:03

## 2018-06-02 NOTE — PROGRESS NOTE ADULT - ASSESSMENT
Plan:    #Neuro:  -neuro checks q 2 hrs and prn for changes  -24 EEG to r/o sz activity  -continue with AED- valproic acid  -ativan IVP first line for sz activity  -will need to change to IV form of valproic acid (as peg placement needs to be assessed )  -sz precautions    #Pulm:  -mechanical vent therapy - titrate to maintain ph 7.35-7.45; PCO2 35-45; SPO2 > 92%  -duoneb therapy q 6 hrs  -Pulmicort 0.5 q 12 hr   -CTA chest to eval parenchyma and r/o P.E.  -HOB >/= 30 degree angle  -chest pt q 2 hrs    #CV:  -ECG now and q a.m. x 3  -cardiac enzymes now and q 8 hrs x 3  -obtain TTE to eval RVFx/LVFx and to help r/o PE Pena's sign  -norepinephrine gtt - titrate to MAP >/= 65 mmHg    #GI/:  -pt with peg  -as peg noted to be slightly dislodged during exam - will need gastroview study  -npo at present  -strict I & O's - keep even  -protonix 40 mg IV q.d.    #I.D.:  -pan culture  -obtain RVP  -continue with zithromax 500 mg IV qd x 5 days  -continue zosyn 3.375 gm IV q 8 hrs  -continue vanco 1 gm q 12 hr IV   -titrate vanco to trough of 15 to 20  -hydrocortisone 100 mg IV q 8  -thiamine 200 mg IV q 12 hr   -Vitamin c 1.5 gms IV q 6 hrs    #FEN/ENDO/HEME:  -Na 124 (baseline 134 - 135) s/p arrest, hyperglycemic s/p epi IVP - repeat blood work  -obtain cmp/mg++/po--4/coags/cbc q 12 hrs  -obtain ABG now and prn  -POC glucose q 4 hr with ISS - maintain glucose 140 to 160  -LR @ 100 cc/hr  -DVT prophylaxis - PAS stockings 27 yr old male PMHx of MRCP, non verbal, wheel chair bound, Asthma (reactive airway dz), Seizure disorder, scoliosis, aspiration pna in past, recent PEG replacement  april/2018 brought in to ED by parents from home with progressive/worsening  resp distress over two day period found to have viral septic shock requiring pressor support c/b acute hypoxemic respiratory failure requiring intubation.    Plan:    #Neuro:  -neuro checks q 2 hrs and prn for changes  -24 EEG no seizure activity  -continue with AED- valproic acid  -ativan IVP first line for sz activity  -will need to change to IV form of valproic acid (as peg placement needs to be assessed )  -sz precautions, CT head no structural abnormalities    #Pulm:  -mechanical vent therapy - titrate to maintain ph 7.35-7.45; PCO2 35-45; SPO2 > 92%  -duoneb therapy q 6 hrs  -Pulmicort 0.5 q 12 hr   -CTA chest revealed multifocal left lobar PNA, no PE  -HOB >/= 30 degree angle  -chest pt q 2 hrs    #CV:  -ECG now and q a.m. x 3  - will obtain cardiac enzymes X 1, previous one elevated post resuscitation likely demand ischemia  -obtain TTE to eval RVFx/LVFx for PEA arrest  -phenylephrine gtt - titrate to MAP >/= 65 mmHg    #GI/:  -pt with peg  - Xray abdomen confirmed PEG placement  - will resume tube feeds  -strict I & O's - keep even  -protonix 40 mg IV q.d.    #I.D.:  -pan culture  - RVP revealed enterorhinovirus, c/w supportive measures  -discontinue zithromax as urine legionella negative  -continue zosyn 3.375 gm IV q 8 hrs  -continue vanco 1 gm q 12 hr IV   -titrate vanco to trough of 15 to 20  -hydrocortisone 100 mg IV q 8    #FEN/ENDO/HEME:  -Na 128 (baseline 134 - 135) s/p arrest, urine lytes consistent with SIADH  - Fluid restrict  -POC glucose q 4 hr with ISS - maintain glucose 140 to 160    -DVT prophylaxis - PAS stockings      Roxy Meyers PGY-1  [facility]  Pager # [phone]/ [phone]

## 2018-06-02 NOTE — DIETITIAN INITIAL EVALUATION ADULT. - ENERGY NEEDS
Ht: 58 inches Wt: 105 pounds BMI: 22 kg/m2 IBW: 102 pounds(+/-10%) %%  +1 generalized edema. Suspected DTI left foot pressure ulcer documented.

## 2018-06-02 NOTE — EEG REPORT - NS EEG TEXT BOX
[facility]   COMPREHENSIVE EPILEPSY CENTER   REPORT OF CONTINUOUS VIDEO EEG     [facility]: [address] [place] [address] , [address], [place] [address], Ph#: [phone]  [facility]: [address], [place] [address], Ph#: [phone]  Office: [address] [facility], [facility] [address], [place] [address] Ph#: [phone]    Patient Name: RADHA VILLALPANDO  Age and : 27y (12)  MRN #: [id number]  Location: [facility] 515   Referring Physician: Susie Whittington    Study Date: 18 - 18    _____________________________________________________________  HISTORY    Patient is a 27y old  Male who presents with a chief complaint of sepsis/ cardiac arrest (2018 17:37)      PERTINENT MEDICATION:  propofol Infusion 25 MICROgram(s)/kG/Min (7.5 mL/Hr) IV Continuous <Continuous>  valproic  acid Syrup 750 milliGRAM(s) Oral every 8 hours    _____________________________________________________________  STUDY INTERPRETATION    Findings:  The background was spontaneously variable, non-reactive, and in burst suppression that consisted of 1-3s of diffuse theta/delta alternating with minutes (up to >1hour) of inter-burst interval. No posterior dominant rhythm seen.    Focal Slowing:   None was present.    Sleep Background:  Stage II sleep transients were not recorded.    Other Non-Epileptiform Findings:  None were present.    Interictal Epileptiform Activity:   None were present.    Events:  Clinical events: None recorded.  Seizures: None recorded.    Activation Procedures:   Hyperventilation was not performed.    Photic stimulation was not performed.     Artifacts:  Intermittent myogenic and movement artifacts were noted.    ECG:  The heart rate on single channel ECG was predominantly between 80-90 BPM.    _____________________________________________________________  EEG SUMMARY/CLASSIFICATION    Abnormal EEG in the awake, drowsy, and asleep states due to  - Severe generalized slowing with burst suppression background.    _____________________________________________________________  EEG IMPRESSION/CLINICAL CORRELATE    Abnormal EEG study.  1. Severe nonspecific diffuse or multifocal cerebral dysfunction, which may be at least in part due to sedation.  2. No epileptiform pattern or seizure seen.      Coco Gaytan MD  Attending Physician, [facility] Epilepsy [place]

## 2018-06-02 NOTE — PROGRESS NOTE ADULT - SUBJECTIVE AND OBJECTIVE BOX
Chief compliant:     INTERVAL HPI/OVERNIGHT EVENTS:    O/N:    SUBJECTIVE: Patient seen and examined at bedside.     CONSTITUTIONAL: No weakness, fevers or chills  EYES/ENT: No visual changes;  No vertigo or throat pain   NECK: No pain or stiffness  RESPIRATORY: No cough, wheezing, hemoptysis; No shortness of breath  CARDIOVASCULAR: No chest pain or palpitations  GASTROINTESTINAL: No abdominal or epigastric pain. No nausea, vomiting, or hematemesis; No diarrhea or constipation. No melena or hematochezia.  GENITOURINARY: No dysuria, frequency or hematuria  NEUROLOGICAL: No numbness or weakness  SKIN: No itching, rashes    OBJECTIVE:    VITAL SIGNS:  ICU Vital Signs Last 24 Hrs  T(C): 37.5 (2018 12:00), Max: 37.5 (2018 12:00)  T(F): 99.5 (2018 12:00), Max: 99.5 (2018 12:00)  HR: 116 (2018 12:08) (92 - 169)  BP: 98/64 (:00) (72/51 - 200/122)  BP(mean): 76 (2018 12:00) (58 - 104)  ABP: --  ABP(mean): --  RR: 17 (2018 12:00) (15 - 47)  SpO2: 100% (2018 12:08) (93% - 100%)    Mode: AC/ CMV (Assist Control/ Continuous Mandatory Ventilation), RR (machine): 16, TV (machine): 350, FiO2: 40, PEEP: 6, ITime: 1, MAP: 11, PIP: 35     @ 07:02 @ 07:00  --------------------------------------------------------  IN: 3428 mL / OUT: 2425 mL / NET: 1003 mL     @ 07:01  02 @ 12:17  --------------------------------------------------------  IN: 498.3 mL / OUT: 400 mL / NET: 98.3 mL      CAPILLARY BLOOD GLUCOSE      POCT Blood Glucose.: 130 mg/dL (2018 05:14)      PHYSICAL EXAM:    General: NAD  HEENT: NC/AT; PERRL, clear conjunctiva  Neck: supple  Respiratory: CTA b/l  Cardiovascular: +S1/S2; RRR  Abdomen: soft, NT/ND; +BS x4  Extremities: WWP, 2+ peripheral pulses b/l; no LE edema  Skin: normal color and turgor; no rash  Neurological:    MEDICATIONS:  MEDICATIONS  (STANDING):  heparin  Injectable 5000 Unit(s) SubCutaneous every 12 hours  hydrocortisone sodium succinate Injectable 100 milliGRAM(s) IV Push every 8 hours  insulin lispro (HumaLOG) corrective regimen sliding scale   SubCutaneous every 6 hours  montelukast 10 milliGRAM(s) Oral daily  phenylephrine    Infusion 0.04 MICROgram(s)/kG/Min (0.615 mL/Hr) IV Continuous <Continuous>  piperacillin/tazobactam IVPB. 3.375 Gram(s) IV Intermittent every 8 hours  polyethylene glycol 3350 17 Gram(s) Oral two times a day  primidone 100 milliGRAM(s) Oral every 12 hours  propofol Infusion 25 MICROgram(s)/kG/Min (7.5 mL/Hr) IV Continuous <Continuous>  senna Syrup 10 milliLiter(s) Oral at bedtime  valproic  acid Syrup 750 milliGRAM(s) Oral every 8 hours  vancomycin  IVPB 1000 milliGRAM(s) IV Intermittent every 12 hours    MEDICATIONS  (PRN):      ALLERGIES:  Allergies    No Known Allergies    Intolerances        LABS:                        14.8   33.8  )-----------( 267      ( 2018 01:16 )             43.2     06-02    128<L>  |  98  |  7   ----------------------------<  153<H>  4.7   |  19<L>  |  <0.30<L>    Ca    8.5      2018 06:16  Phos  2.4     06-02  Mg     2.3     06-02    TPro  5.6<L>  /  Alb  2.9<L>  /  TBili  0.2  /  DBili  x   /  AST  79<H>  /  ALT  60<H>  /  AlkPhos  67  06-02    PT/INR - ( 2018 01:16 )   PT: 15.6 sec;   INR: 1.43 ratio         PTT - ( 2018 01:16 )  PTT:34.0 sec  Urinalysis Basic - ( 2018 17:00 )    Color: Yellow / Appearance: SL Turbid / S.023 / pH: x  Gluc: x / Ketone: Trace  / Bili: Negative / Urobili: 1 mg/dL   Blood: x / Protein: 150 mg/dL / Nitrite: Negative   Leuk Esterase: Negative / RBC: 0-2 /HPF / WBC 3-5 /HPF   Sq Epi: x / Non Sq Epi: Few /HPF / Bacteria: Few /HPF        RADIOLOGY & ADDITIONAL TESTS: Reviewed. Chief compliant:     INTERVAL HPI/OVERNIGHT EVENTS:      SUBJECTIVE: Patient seen and examined at bedside.     CONSTITUTIONAL: No weakness, fevers or chills  EYES/ENT: No visual changes;  No vertigo or throat pain   NECK: No pain or stiffness  RESPIRATORY: No cough, wheezing, hemoptysis; No shortness of breath  CARDIOVASCULAR: No chest pain or palpitations  GASTROINTESTINAL: No abdominal or epigastric pain. No nausea, vomiting, or hematemesis; No diarrhea or constipation. No melena or hematochezia.  GENITOURINARY: No dysuria, frequency or hematuria  NEUROLOGICAL: No numbness or weakness  SKIN: No itching, rashes    OBJECTIVE:    VITAL SIGNS:  ICU Vital Signs Last 24 Hrs  T(C): 37.5 (2018 12:00), Max: 37.5 (2018 12:00)  T(F): 99.5 (2018 12:00), Max: 99.5 (2018 12:00)  HR: 116 (2018 12:08) (92 - 169)  BP: 98/64 (:00) (72/51 - 200/122)  BP(mean): 76 (2018 12:00) (58 - 104)  ABP: --  ABP(mean): --  RR: 17 (2018 12:00) (15 - 47)  SpO2: 100% (2018 12:08) (93% - 100%)    Mode: AC/ CMV (Assist Control/ Continuous Mandatory Ventilation), RR (machine): 16, TV (machine): 350, FiO2: 40, PEEP: 6, ITime: 1, MAP: 11, PIP: 35     @ 07: @ 07:00  --------------------------------------------------------  IN: 3428 mL / OUT: 2425 mL / NET: 1003 mL     @ 07: @ 12:17  --------------------------------------------------------  IN: 498.3 mL / OUT: 400 mL / NET: 98.3 mL      CAPILLARY BLOOD GLUCOSE      POCT Blood Glucose.: 130 mg/dL (2018 05:14)      PHYSICAL EXAM:    General: NAD  HEENT: NC/AT; PERRL, clear conjunctiva  Neck: supple  Respiratory: CTA b/l  Cardiovascular: +S1/S2; RRR  Abdomen: soft, NT/ND; +BS x4  Extremities: WWP, 2+ peripheral pulses b/l; no LE edema  Skin: normal color and turgor; no rash  Neurological:    MEDICATIONS:  MEDICATIONS  (STANDING):  heparin  Injectable 5000 Unit(s) SubCutaneous every 12 hours  hydrocortisone sodium succinate Injectable 100 milliGRAM(s) IV Push every 8 hours  insulin lispro (HumaLOG) corrective regimen sliding scale   SubCutaneous every 6 hours  montelukast 10 milliGRAM(s) Oral daily  phenylephrine    Infusion 0.04 MICROgram(s)/kG/Min (0.615 mL/Hr) IV Continuous <Continuous>  piperacillin/tazobactam IVPB. 3.375 Gram(s) IV Intermittent every 8 hours  polyethylene glycol 3350 17 Gram(s) Oral two times a day  primidone 100 milliGRAM(s) Oral every 12 hours  propofol Infusion 25 MICROgram(s)/kG/Min (7.5 mL/Hr) IV Continuous <Continuous>  senna Syrup 10 milliLiter(s) Oral at bedtime  valproic  acid Syrup 750 milliGRAM(s) Oral every 8 hours  vancomycin  IVPB 1000 milliGRAM(s) IV Intermittent every 12 hours    MEDICATIONS  (PRN):      ALLERGIES:  Allergies    No Known Allergies    Intolerances        LABS:                        14.8   33.8  )-----------( 267      ( 2018 01:16 )             43.2     06-02    128<L>  |  98  |  7   ----------------------------<  153<H>  4.7   |  19<L>  |  <0.30<L>    Ca    8.5      2018 06:16  Phos  2.4     06-02  Mg     2.3     06-02    TPro  5.6<L>  /  Alb  2.9<L>  /  TBili  0.2  /  DBili  x   /  AST  79<H>  /  ALT  60<H>  /  AlkPhos  67  06-02    PT/INR - ( 2018 01:16 )   PT: 15.6 sec;   INR: 1.43 ratio         PTT - ( 2018 01:16 )  PTT:34.0 sec  Urinalysis Basic - ( 2018 17:00 )    Color: Yellow / Appearance: SL Turbid / S.023 / pH: x  Gluc: x / Ketone: Trace  / Bili: Negative / Urobili: 1 mg/dL   Blood: x / Protein: 150 mg/dL / Nitrite: Negative   Leuk Esterase: Negative / RBC: 0-2 /HPF / WBC 3-5 /HPF   Sq Epi: x / Non Sq Epi: Few /HPF / Bacteria: Few /HPF        RADIOLOGY & ADDITIONAL TESTS: Reviewed. Overnight has persistent R. shoulder twitching received IV ativan 2 mg X 1 with relief. Increased propofol as well. Levophed caused tachycardia switched to phenylephrine.    SUBJECTIVE: Patient seen and examined at bedside. Intubated and sedated.    OBJECTIVE:    VITAL SIGNS:  ICU Vital Signs Last 24 Hrs  T(C): 37.5 (2018 12:00), Max: 37.5 (2018 12:00)  T(F): 99.5 (2018 12:00), Max: 99.5 (2018 12:00)  HR: 116 (2018 12:08) (92 - 169)  BP: 98/64 (2018 12:00) (72/51 - 200/122)  BP(mean): 76 (:00) (58 - 104)  ABP: --  ABP(mean): --  RR: 17 (2018 12:00) (15 - 47)  SpO2: 100% (:08) (93% - 100%)    Mode: AC/ CMV (Assist Control/ Continuous Mandatory Ventilation), RR (machine): 16, TV (machine): 350, FiO2: 40, PEEP: 6, ITime: 1, MAP: 11, PIP: 35     @ 07:  -  02 @ 07:00  --------------------------------------------------------  IN: 3428 mL / OUT: 2425 mL / NET: 1003 mL     @ 07:  -  02 @ 12:17  --------------------------------------------------------  IN: 498.3 mL / OUT: 400 mL / NET: 98.3 mL      CAPILLARY BLOOD GLUCOSE      POCT Blood Glucose.: 130 mg/dL (2018 05:14)      PHYSICAL EXAM:    General: NAD  HEENT: NC/AT; PERRL, clear conjunctiva  Neck: supple  Respiratory: CTA b/l  Cardiovascular: +S1/S2; RRR  Abdomen: soft, NT/ND; +BS x4  Extremities: WWP, 2+ peripheral pulses b/l; no LE edema  Skin: normal color and turgor; no rash  Neurological:    MEDICATIONS:  MEDICATIONS  (STANDING):  heparin  Injectable 5000 Unit(s) SubCutaneous every 12 hours  hydrocortisone sodium succinate Injectable 100 milliGRAM(s) IV Push every 8 hours  insulin lispro (HumaLOG) corrective regimen sliding scale   SubCutaneous every 6 hours  montelukast 10 milliGRAM(s) Oral daily  phenylephrine    Infusion 0.04 MICROgram(s)/kG/Min (0.615 mL/Hr) IV Continuous <Continuous>  piperacillin/tazobactam IVPB. 3.375 Gram(s) IV Intermittent every 8 hours  polyethylene glycol 3350 17 Gram(s) Oral two times a day  primidone 100 milliGRAM(s) Oral every 12 hours  propofol Infusion 25 MICROgram(s)/kG/Min (7.5 mL/Hr) IV Continuous <Continuous>  senna Syrup 10 milliLiter(s) Oral at bedtime  valproic  acid Syrup 750 milliGRAM(s) Oral every 8 hours  vancomycin  IVPB 1000 milliGRAM(s) IV Intermittent every 12 hours    MEDICATIONS  (PRN):      ALLERGIES:  Allergies    No Known Allergies    Intolerances        LABS:                        14.8   33.8  )-----------( 267      ( 2018 01:16 )             43.2     06-02    128<L>  |  98  |  7   ----------------------------<  153<H>  4.7   |  19<L>  |  <0.30<L>    Ca    8.5      2018 06:16  Phos  2.4     06-02  Mg     2.3     06-02    TPro  5.6<L>  /  Alb  2.9<L>  /  TBili  0.2  /  DBili  x   /  AST  79<H>  /  ALT  60<H>  /  AlkPhos  67  06-02    PT/INR - ( 2018 01:16 )   PT: 15.6 sec;   INR: 1.43 ratio         PTT - ( 2018 01:16 )  PTT:34.0 sec  Urinalysis Basic - ( 2018 17:00 )    Color: Yellow / Appearance: SL Turbid / S.023 / pH: x  Gluc: x / Ketone: Trace  / Bili: Negative / Urobili: 1 mg/dL   Blood: x / Protein: 150 mg/dL / Nitrite: Negative   Leuk Esterase: Negative / RBC: 0-2 /HPF / WBC 3-5 /HPF   Sq Epi: x / Non Sq Epi: Few /HPF / Bacteria: Few /HPF        RADIOLOGY & ADDITIONAL TESTS: Reviewed.

## 2018-06-02 NOTE — DIETITIAN INITIAL EVALUATION ADULT. - OTHER INFO
Pt seen for nutrition consult for tube feeding. Per chart pt admitted with sepsis/cardiac arrest. Pt now intubated on propofol (7.5ml/hr) for vent coordination and comfort. Per chart PEG noted to be slightly dislodged during exam - will need gastroview study, currently NPO. Per pt's mother pt's  pounds, per previous RD note (9/2/17) 104 pounds , dosing weight 90.4 pounds, current (6/2) 96 pounds  - Pt's mother denies, reports bed scale inaccurate and denies any recent weight loss. RD to continue to monitor. Pt's mother declined to have any nutrition-related questions/concerns at this time. Pt's family made aware RD remains available.

## 2018-06-02 NOTE — DIETITIAN INITIAL EVALUATION ADULT. - ORAL INTAKE PTA
Pt receives enteral nutrition via PEG: Bolus feeds of Peptamen 240ml 5x daily (7am,1pm,4pm,7pm,10pm) daily to provide 1200ml fluid, 1800Kcal, 82gm protein (38kcal/Kg, 1.7gm protein/Kg UBW 47.7Kg).

## 2018-06-02 NOTE — DIETITIAN INITIAL EVALUATION ADULT. - NS AS NUTRI INTERV ENTERAL NUTRITION
When medically feasible if pt remains intubated recommend Vital 1.5 @ 55ml/hr x 18 hours to provide 990ml fluid, 1485kcal, 67gm protein (31Kcal/Kg, 1.4gm protein/Kg UBW 47.7Kg). Pending medical course/extubation- RD to monitor need to increase tube feeds.

## 2018-06-03 LAB
ALBUMIN SERPL ELPH-MCNC: 2.6 G/DL — LOW (ref 3.3–5)
ALP SERPL-CCNC: 64 U/L — SIGNIFICANT CHANGE UP (ref 40–120)
ALT FLD-CCNC: 37 U/L — SIGNIFICANT CHANGE UP (ref 10–45)
ANION GAP SERPL CALC-SCNC: 10 MMOL/L — SIGNIFICANT CHANGE UP (ref 5–17)
ANION GAP SERPL CALC-SCNC: 13 MMOL/L — SIGNIFICANT CHANGE UP (ref 5–17)
APTT BLD: 30.5 SEC — SIGNIFICANT CHANGE UP (ref 27.5–37.4)
AST SERPL-CCNC: 38 U/L — SIGNIFICANT CHANGE UP (ref 10–40)
BILIRUB SERPL-MCNC: 0.2 MG/DL — SIGNIFICANT CHANGE UP (ref 0.2–1.2)
BUN SERPL-MCNC: 5 MG/DL — LOW (ref 7–23)
BUN SERPL-MCNC: 6 MG/DL — LOW (ref 7–23)
CALCIUM SERPL-MCNC: 8.7 MG/DL — SIGNIFICANT CHANGE UP (ref 8.4–10.5)
CALCIUM SERPL-MCNC: 8.9 MG/DL — SIGNIFICANT CHANGE UP (ref 8.4–10.5)
CHLORIDE SERPL-SCNC: 94 MMOL/L — LOW (ref 96–108)
CHLORIDE SERPL-SCNC: 95 MMOL/L — LOW (ref 96–108)
CO2 SERPL-SCNC: 26 MMOL/L — SIGNIFICANT CHANGE UP (ref 22–31)
CO2 SERPL-SCNC: 27 MMOL/L — SIGNIFICANT CHANGE UP (ref 22–31)
CREAT SERPL-MCNC: <0.3 MG/DL — LOW (ref 0.5–1.3)
CREAT SERPL-MCNC: <0.3 MG/DL — LOW (ref 0.5–1.3)
GAS PNL BLDA: SIGNIFICANT CHANGE UP
GLUCOSE BLDC GLUCOMTR-MCNC: 157 MG/DL — HIGH (ref 70–99)
GLUCOSE BLDC GLUCOMTR-MCNC: 167 MG/DL — HIGH (ref 70–99)
GLUCOSE BLDC GLUCOMTR-MCNC: 179 MG/DL — HIGH (ref 70–99)
GLUCOSE BLDC GLUCOMTR-MCNC: 205 MG/DL — HIGH (ref 70–99)
GLUCOSE SERPL-MCNC: 158 MG/DL — HIGH (ref 70–99)
GLUCOSE SERPL-MCNC: 170 MG/DL — HIGH (ref 70–99)
HCT VFR BLD CALC: 36.6 % — LOW (ref 39–50)
HGB BLD-MCNC: 13.1 G/DL — SIGNIFICANT CHANGE UP (ref 13–17)
INR BLD: 1.19 RATIO — HIGH (ref 0.88–1.16)
MAGNESIUM SERPL-MCNC: 2 MG/DL — SIGNIFICANT CHANGE UP (ref 1.6–2.6)
MAGNESIUM SERPL-MCNC: 2.1 MG/DL — SIGNIFICANT CHANGE UP (ref 1.6–2.6)
MCHC RBC-ENTMCNC: 32.4 PG — SIGNIFICANT CHANGE UP (ref 27–34)
MCHC RBC-ENTMCNC: 35.7 GM/DL — SIGNIFICANT CHANGE UP (ref 32–36)
MCV RBC AUTO: 90.7 FL — SIGNIFICANT CHANGE UP (ref 80–100)
PHOSPHATE SERPL-MCNC: 2.3 MG/DL — LOW (ref 2.5–4.5)
PHOSPHATE SERPL-MCNC: 3.2 MG/DL — SIGNIFICANT CHANGE UP (ref 2.5–4.5)
PLATELET # BLD AUTO: 210 K/UL — SIGNIFICANT CHANGE UP (ref 150–400)
POTASSIUM SERPL-MCNC: 3.6 MMOL/L — SIGNIFICANT CHANGE UP (ref 3.5–5.3)
POTASSIUM SERPL-MCNC: 4 MMOL/L — SIGNIFICANT CHANGE UP (ref 3.5–5.3)
POTASSIUM SERPL-SCNC: 3.6 MMOL/L — SIGNIFICANT CHANGE UP (ref 3.5–5.3)
POTASSIUM SERPL-SCNC: 4 MMOL/L — SIGNIFICANT CHANGE UP (ref 3.5–5.3)
PROT SERPL-MCNC: 5.6 G/DL — LOW (ref 6–8.3)
PROTHROM AB SERPL-ACNC: 13 SEC — HIGH (ref 9.8–12.7)
RBC # BLD: 4.03 M/UL — LOW (ref 4.2–5.8)
RBC # FLD: 12.1 % — SIGNIFICANT CHANGE UP (ref 10.3–14.5)
SODIUM SERPL-SCNC: 130 MMOL/L — LOW (ref 135–145)
SODIUM SERPL-SCNC: 135 MMOL/L — SIGNIFICANT CHANGE UP (ref 135–145)
WBC # BLD: 25 K/UL — HIGH (ref 3.8–10.5)
WBC # FLD AUTO: 25 K/UL — HIGH (ref 3.8–10.5)

## 2018-06-03 PROCEDURE — 99291 CRITICAL CARE FIRST HOUR: CPT

## 2018-06-03 PROCEDURE — 95819 EEG AWAKE AND ASLEEP: CPT | Mod: 26

## 2018-06-03 PROCEDURE — 93306 TTE W/DOPPLER COMPLETE: CPT | Mod: 26

## 2018-06-03 RX ORDER — SODIUM CHLORIDE 9 MG/ML
250 INJECTION INTRAMUSCULAR; INTRAVENOUS; SUBCUTANEOUS ONCE
Qty: 0 | Refills: 0 | Status: COMPLETED | OUTPATIENT
Start: 2018-06-03 | End: 2018-06-03

## 2018-06-03 RX ORDER — IPRATROPIUM/ALBUTEROL SULFATE 18-103MCG
3 AEROSOL WITH ADAPTER (GRAM) INHALATION EVERY 6 HOURS
Qty: 0 | Refills: 0 | Status: DISCONTINUED | OUTPATIENT
Start: 2018-06-03 | End: 2018-06-06

## 2018-06-03 RX ORDER — ALBUTEROL 90 UG/1
1 AEROSOL, METERED ORAL EVERY 4 HOURS
Qty: 0 | Refills: 0 | Status: DISCONTINUED | OUTPATIENT
Start: 2018-06-03 | End: 2018-06-03

## 2018-06-03 RX ORDER — TIOTROPIUM BROMIDE 18 UG/1
1 CAPSULE ORAL; RESPIRATORY (INHALATION) DAILY
Qty: 0 | Refills: 0 | Status: DISCONTINUED | OUTPATIENT
Start: 2018-06-03 | End: 2018-06-06

## 2018-06-03 RX ORDER — HYDROCORTISONE 20 MG
50 TABLET ORAL EVERY 8 HOURS
Qty: 0 | Refills: 0 | Status: DISCONTINUED | OUTPATIENT
Start: 2018-06-03 | End: 2018-06-04

## 2018-06-03 RX ORDER — DEXMEDETOMIDINE HYDROCHLORIDE IN 0.9% SODIUM CHLORIDE 4 UG/ML
0.1 INJECTION INTRAVENOUS
Qty: 200 | Refills: 0 | Status: DISCONTINUED | OUTPATIENT
Start: 2018-06-03 | End: 2018-06-06

## 2018-06-03 RX ORDER — PHENYLEPHRINE HYDROCHLORIDE 10 MG/ML
1 INJECTION INTRAVENOUS
Qty: 40 | Refills: 0 | Status: DISCONTINUED | OUTPATIENT
Start: 2018-06-03 | End: 2018-06-04

## 2018-06-03 RX ADMIN — DEXMEDETOMIDINE HYDROCHLORIDE IN 0.9% SODIUM CHLORIDE 1.02 MICROGRAM(S)/KG/HR: 4 INJECTION INTRAVENOUS at 19:00

## 2018-06-03 RX ADMIN — Medication 1 DROP(S): at 05:36

## 2018-06-03 RX ADMIN — Medication 100 MILLIGRAM(S): at 05:35

## 2018-06-03 RX ADMIN — POLYETHYLENE GLYCOL 3350 17 GRAM(S): 17 POWDER, FOR SOLUTION ORAL at 05:36

## 2018-06-03 RX ADMIN — Medication 1: at 01:07

## 2018-06-03 RX ADMIN — Medication 3 MILLILITER(S): at 23:36

## 2018-06-03 RX ADMIN — Medication 1: at 11:12

## 2018-06-03 RX ADMIN — Medication 250 MILLIGRAM(S): at 05:34

## 2018-06-03 RX ADMIN — SENNA PLUS 10 MILLILITER(S): 8.6 TABLET ORAL at 22:26

## 2018-06-03 RX ADMIN — Medication 750 MILLIGRAM(S): at 14:10

## 2018-06-03 RX ADMIN — PRIMIDONE 100 MILLIGRAM(S): 250 TABLET ORAL at 17:10

## 2018-06-03 RX ADMIN — MONTELUKAST 10 MILLIGRAM(S): 4 TABLET, CHEWABLE ORAL at 11:12

## 2018-06-03 RX ADMIN — Medication 750 MILLIGRAM(S): at 22:27

## 2018-06-03 RX ADMIN — Medication 1 DROP(S): at 14:07

## 2018-06-03 RX ADMIN — PIPERACILLIN AND TAZOBACTAM 25 GRAM(S): 4; .5 INJECTION, POWDER, LYOPHILIZED, FOR SOLUTION INTRAVENOUS at 01:08

## 2018-06-03 RX ADMIN — HEPARIN SODIUM 5000 UNIT(S): 5000 INJECTION INTRAVENOUS; SUBCUTANEOUS at 17:10

## 2018-06-03 RX ADMIN — Medication 2: at 06:11

## 2018-06-03 RX ADMIN — Medication 3 MILLILITER(S): at 11:29

## 2018-06-03 RX ADMIN — Medication 3 MILLILITER(S): at 17:00

## 2018-06-03 RX ADMIN — Medication 1 DROP(S): at 22:26

## 2018-06-03 RX ADMIN — Medication 750 MILLIGRAM(S): at 05:35

## 2018-06-03 RX ADMIN — Medication 62.5 MILLIMOLE(S): at 04:29

## 2018-06-03 RX ADMIN — POLYETHYLENE GLYCOL 3350 17 GRAM(S): 17 POWDER, FOR SOLUTION ORAL at 17:11

## 2018-06-03 RX ADMIN — Medication 100 MILLIGRAM(S): at 14:05

## 2018-06-03 RX ADMIN — Medication 1: at 17:11

## 2018-06-03 RX ADMIN — PRIMIDONE 100 MILLIGRAM(S): 250 TABLET ORAL at 05:35

## 2018-06-03 RX ADMIN — HEPARIN SODIUM 5000 UNIT(S): 5000 INJECTION INTRAVENOUS; SUBCUTANEOUS at 05:35

## 2018-06-03 RX ADMIN — PIPERACILLIN AND TAZOBACTAM 25 GRAM(S): 4; .5 INJECTION, POWDER, LYOPHILIZED, FOR SOLUTION INTRAVENOUS at 17:09

## 2018-06-03 RX ADMIN — SODIUM CHLORIDE 1000 MILLILITER(S): 9 INJECTION INTRAMUSCULAR; INTRAVENOUS; SUBCUTANEOUS at 20:00

## 2018-06-03 RX ADMIN — Medication 50 MILLIGRAM(S): at 22:40

## 2018-06-03 RX ADMIN — PIPERACILLIN AND TAZOBACTAM 25 GRAM(S): 4; .5 INJECTION, POWDER, LYOPHILIZED, FOR SOLUTION INTRAVENOUS at 09:58

## 2018-06-03 RX ADMIN — PROPOFOL 7.5 MICROGRAM(S)/KG/MIN: 10 INJECTION, EMULSION INTRAVENOUS at 12:30

## 2018-06-03 NOTE — EEG REPORT - NS EEG TEXT BOX
[facility]   COMPREHENSIVE EPILEPSY CENTER   REPORT OF ROUTINE VIDEO EEG     [facility]: [address] [place] [address] , [address], [place] [address], Ph#: [phone]  [facility]: [id number], [place] [address], Ph#: [phone]  Office: [address] [place] [address], [facility] [address], [place] [address] Ph#: [phone]    Patient Name: RADHA VILLALPANDO  Age and : 27y (90)  MRN #: [id number]  Location: [facility]   Referring Physician: Susie Whittington    Study Date: 18    _____________________________________________________________  HISTORY    Patient is a 27y old  Male who presents with a chief complaint of sepsis/ cardiac arrest (2018 17:37)      PERTINENT MEDICATION:  propofol Infusion 25 MICROgram(s)/kG/Min (7.5 mL/Hr) IV Continuous <Continuous>  valproic  acid Syrup 750 milliGRAM(s) Oral every 8 hours    _____________________________________________________________  STUDY INTERPRETATION    Findings:  The background was spontaneously variable, non-reactive, and in burst suppression pattern that consisted of 1s of diffuse theta/delta alternating with 3-9min of inter-burst interval. No posterior dominant rhythm seen.    Focal Slowing:   None was present.    Sleep Background:  Stage II sleep transients were not recorded.    Other Non-Epileptiform Findings:  None were present.    Interictal Epileptiform Activity:   None were present.    Events:  Clinical events: None recorded.  Seizures: None recorded.    Activation Procedures:   Hyperventilation was not performed.    Photic stimulation was not performed.     Artifacts:  Intermittent myogenic and movement artifacts were noted.    ECG:  The heart rate on single channel ECG was predominantly between 80-90 BPM.    _____________________________________________________________  EEG SUMMARY/CLASSIFICATION    Abnormal EEG in the awake, drowsy, and asleep states due to  - Severe generalized slowing with burst suppression background.    _____________________________________________________________  EEG IMPRESSION/CLINICAL CORRELATE    Abnormal EEG study.  1. Severe nonspecific diffuse or multifocal cerebral dysfunction, which may be at least in part due to sedation.  2. No epileptiform pattern or seizure seen.      Coco Gaytan MD  Attending Physician, [facility] Epilepsy [place]

## 2018-06-03 NOTE — PROGRESS NOTE ADULT - ASSESSMENT
27 yr old male PMHx of MRCP, non verbal, wheel chair bound, Asthma (reactive airway dz), Seizure disorder, scoliosis, aspiration pna in past, recent PEG replacement  april/2018 brought in to ED by parents from home with progressive/worsening  resp distress over two day period found to have viral septic shock requiring pressor support c/b acute hypoxemic respiratory failure requiring intubation.      #Neuro:  -neuro checks q 2 hrs and prn for changes  -24 EEG no seizure activity  -continue with AED- valproic acid  -ativan IVP first line for sz activity  -will need to change to IV form of valproic acid (as peg placement needs to be assessed )  -sz precautions, CT head no structural abnormalities  - will obtain continuous EEG to safely wean off propofol.      #Pulm:  -mechanical vent therapy - titrate to maintain ph 7.35-7.45; PCO2 35-45; SPO2 > 92%  -duoneb therapy q 6 hrs  -CTA chest revealed multifocal left lobar PNA, no PE  -HOB >/= 30 degree angle  -chest pt q 2 hrs    #CV:    - will obtain cardiac enzymes X 1, previous one elevated post resuscitation likely demand ischemia  -obtain TTE to eval RVFx/LVFx for PEA arrest  -phenylephrine gtt - titrate to MAP >/= 65 mmHg    #GI/:  -pt with peg  - Xray abdomen confirmed PEG placement  - will resume tube feeds  -strict I & O's - keep even  -protonix 40 mg IV q.d.    #I.D.:  -pan culture  - RVP revealed enterorhinovirus, c/w supportive measures  -discontinue zithromax as urine legionella negative  -continue zosyn 3.375 gm IV q 8 hrs  -continue vanco 1 gm q 12 hr IV   -titrate vanco to trough of 15 to 20  -hydrocortisone 100 mg IV q 8    #FEN/ENDO/HEME:  -Na 128 (baseline 134 - 135) s/p arrest, urine lytes consistent with SIADH  - Fluid restrict  -POC glucose q 4 hr with ISS - maintain glucose 140 to 160    -DVT prophylaxis - PAS armond Meyers PGY-1  [facility]  Pager # [phone]/ [phone]

## 2018-06-03 NOTE — PROGRESS NOTE ADULT - SUBJECTIVE AND OBJECTIVE BOX
INTERVAL HPI/OVERNIGHT EVENTS: No events overnight.       SUBJECTIVE: Patient seen and examined at bedside. Intubated on mechanical ventilation, sedated on propofol    ROS unable to assess due to sedation    OBJECTIVE:    VITAL SIGNS:  ICU Vital Signs Last 24 Hrs  T(C): 37 (2018 04:00), Max: 37.5 (2018 12:00)  T(F): 98.6 (2018 04:00), Max: 99.5 (2018 12:00)  HR: 112 (2018 08:21) (92 - 125)  BP: 108/71 (2018 07:00) (89/58 - 143/80)  BP(mean): 85 (2018 07:00) (67 - 104)  ABP: --  ABP(mean): --  RR: 18 (2018 07:00) (16 - 26)  SpO2: 100% (2018 08:21) (100% - 100%)    Mode: AC/ CMV (Assist Control/ Continuous Mandatory Ventilation), RR (machine): 16, TV (machine): 350, FiO2: 30, PEEP: 6, ITime: 1, MAP: 13, PIP: 40    06-02 @ 07:01  -  06-03 @ 07:00  --------------------------------------------------------  IN: 2314.8 mL / OUT: 1110 mL / NET: 1204.8 mL      CAPILLARY BLOOD GLUCOSE      POCT Blood Glucose.: 205 mg/dL (2018 06:00)    PHYSICAL EXAM:    General: NAD, intubated and sedated  HEENT: NC/AT; PERRL, clear conjunctiva  Neck: supple  Respiratory: CTA b/l  Cardiovascular: +S1/S2; RRR  Abdomen: soft, NT/ND; +BS x4  Extremities: WWP, 2+ peripheral pulses b/l; no LE edema  Skin: normal color and turgor; no rash  Neurological: Sedated    MEDICATIONS:  MEDICATIONS  (STANDING):  artificial  tears Solution 1 Drop(s) Both EYES three times a day  heparin  Injectable 5000 Unit(s) SubCutaneous every 12 hours  hydrocortisone sodium succinate Injectable 100 milliGRAM(s) IV Push every 8 hours  insulin lispro (HumaLOG) corrective regimen sliding scale   SubCutaneous every 6 hours  montelukast 10 milliGRAM(s) Oral daily  phenylephrine    Infusion 0.04 MICROgram(s)/kG/Min (0.615 mL/Hr) IV Continuous <Continuous>  piperacillin/tazobactam IVPB. 3.375 Gram(s) IV Intermittent every 8 hours  polyethylene glycol 3350 17 Gram(s) Oral two times a day  primidone 100 milliGRAM(s) Oral every 12 hours  propofol Infusion 25 MICROgram(s)/kG/Min (7.5 mL/Hr) IV Continuous <Continuous>  senna Syrup 10 milliLiter(s) Oral at bedtime  valproic  acid Syrup 750 milliGRAM(s) Oral every 8 hours  vancomycin  IVPB 1000 milliGRAM(s) IV Intermittent every 12 hours    MEDICATIONS  (PRN):      ALLERGIES:  Allergies    No Known Allergies    Intolerances        LABS:                        13.1   25.0  )-----------( 210      ( 2018 01:27 )             36.6     06-03    130<L>  |  94<L>  |  6<L>  ----------------------------<  170<H>  4.0   |  26  |  <0.30<L>    Ca    8.7      2018 01:27  Phos  2.3     06-03  Mg     2.0     06-03    TPro  5.6<L>  /  Alb  2.6<L>  /  TBili  0.2  /  DBili  x   /  AST  38  /  ALT  37  /  AlkPhos  64  06-03    PT/INR - ( 2018 01:27 )   PT: 13.0 sec;   INR: 1.19 ratio         PTT - ( 2018 01:27 )  PTT:30.5 sec  Urinalysis Basic - ( 2018 17:00 )    Color: Yellow / Appearance: SL Turbid / S.023 / pH: x  Gluc: x / Ketone: Trace  / Bili: Negative / Urobili: 1 mg/dL   Blood: x / Protein: 150 mg/dL / Nitrite: Negative   Leuk Esterase: Negative / RBC: 0-2 /HPF / WBC 3-5 /HPF   Sq Epi: x / Non Sq Epi: Few /HPF / Bacteria: Few /HPF        RADIOLOGY & ADDITIONAL TESTS: Reviewed.

## 2018-06-04 LAB
-  AMIKACIN: SIGNIFICANT CHANGE UP
-  AZTREONAM: SIGNIFICANT CHANGE UP
-  CEFEPIME: SIGNIFICANT CHANGE UP
-  CEFTAZIDIME: SIGNIFICANT CHANGE UP
-  CIPROFLOXACIN: SIGNIFICANT CHANGE UP
-  GENTAMICIN: SIGNIFICANT CHANGE UP
-  IMIPENEM: SIGNIFICANT CHANGE UP
-  LEVOFLOXACIN: SIGNIFICANT CHANGE UP
-  MEROPENEM: SIGNIFICANT CHANGE UP
-  PIPERACILLIN/TAZOBACTAM: SIGNIFICANT CHANGE UP
-  TOBRAMYCIN: SIGNIFICANT CHANGE UP
ALBUMIN SERPL ELPH-MCNC: 2.6 G/DL — LOW (ref 3.3–5)
ALP SERPL-CCNC: 76 U/L — SIGNIFICANT CHANGE UP (ref 40–120)
ALT FLD-CCNC: 27 U/L — SIGNIFICANT CHANGE UP (ref 10–45)
ANION GAP SERPL CALC-SCNC: 9 MMOL/L — SIGNIFICANT CHANGE UP (ref 5–17)
APTT BLD: 33.6 SEC — SIGNIFICANT CHANGE UP (ref 27.5–37.4)
AST SERPL-CCNC: 28 U/L — SIGNIFICANT CHANGE UP (ref 10–40)
BILIRUB SERPL-MCNC: 0.2 MG/DL — SIGNIFICANT CHANGE UP (ref 0.2–1.2)
BUN SERPL-MCNC: 9 MG/DL — SIGNIFICANT CHANGE UP (ref 7–23)
CALCIUM SERPL-MCNC: 8.6 MG/DL — SIGNIFICANT CHANGE UP (ref 8.4–10.5)
CHLORIDE SERPL-SCNC: 96 MMOL/L — SIGNIFICANT CHANGE UP (ref 96–108)
CO2 SERPL-SCNC: 30 MMOL/L — SIGNIFICANT CHANGE UP (ref 22–31)
CREAT SERPL-MCNC: <0.3 MG/DL — LOW (ref 0.5–1.3)
CULTURE RESULTS: SIGNIFICANT CHANGE UP
GLUCOSE BLDC GLUCOMTR-MCNC: 121 MG/DL — HIGH (ref 70–99)
GLUCOSE BLDC GLUCOMTR-MCNC: 135 MG/DL — HIGH (ref 70–99)
GLUCOSE BLDC GLUCOMTR-MCNC: 201 MG/DL — HIGH (ref 70–99)
GLUCOSE BLDC GLUCOMTR-MCNC: 216 MG/DL — HIGH (ref 70–99)
GLUCOSE SERPL-MCNC: 232 MG/DL — HIGH (ref 70–99)
HCT VFR BLD CALC: 36.1 % — LOW (ref 39–50)
HGB BLD-MCNC: 12.3 G/DL — LOW (ref 13–17)
INR BLD: 1.05 RATIO — SIGNIFICANT CHANGE UP (ref 0.88–1.16)
MAGNESIUM SERPL-MCNC: 2.1 MG/DL — SIGNIFICANT CHANGE UP (ref 1.6–2.6)
MCHC RBC-ENTMCNC: 31.1 PG — SIGNIFICANT CHANGE UP (ref 27–34)
MCHC RBC-ENTMCNC: 34 GM/DL — SIGNIFICANT CHANGE UP (ref 32–36)
MCV RBC AUTO: 91.3 FL — SIGNIFICANT CHANGE UP (ref 80–100)
METHOD TYPE: SIGNIFICANT CHANGE UP
ORGANISM # SPEC MICROSCOPIC CNT: SIGNIFICANT CHANGE UP
ORGANISM # SPEC MICROSCOPIC CNT: SIGNIFICANT CHANGE UP
PHOSPHATE SERPL-MCNC: 2.7 MG/DL — SIGNIFICANT CHANGE UP (ref 2.5–4.5)
PLATELET # BLD AUTO: 233 K/UL — SIGNIFICANT CHANGE UP (ref 150–400)
POTASSIUM SERPL-MCNC: 3.4 MMOL/L — LOW (ref 3.5–5.3)
POTASSIUM SERPL-SCNC: 3.4 MMOL/L — LOW (ref 3.5–5.3)
PROT SERPL-MCNC: 5.4 G/DL — LOW (ref 6–8.3)
PROTHROM AB SERPL-ACNC: 11.3 SEC — SIGNIFICANT CHANGE UP (ref 9.8–12.7)
RBC # BLD: 3.96 M/UL — LOW (ref 4.2–5.8)
RBC # FLD: 12.1 % — SIGNIFICANT CHANGE UP (ref 10.3–14.5)
SODIUM SERPL-SCNC: 135 MMOL/L — SIGNIFICANT CHANGE UP (ref 135–145)
SPECIMEN SOURCE: SIGNIFICANT CHANGE UP
WBC # BLD: 21.6 K/UL — HIGH (ref 3.8–10.5)
WBC # FLD AUTO: 21.6 K/UL — HIGH (ref 3.8–10.5)

## 2018-06-04 PROCEDURE — 99291 CRITICAL CARE FIRST HOUR: CPT

## 2018-06-04 PROCEDURE — 95951: CPT | Mod: 26

## 2018-06-04 RX ORDER — LIDOCAINE 4 G/100G
1 CREAM TOPICAL DAILY
Qty: 0 | Refills: 0 | Status: DISCONTINUED | OUTPATIENT
Start: 2018-06-04 | End: 2018-06-06

## 2018-06-04 RX ORDER — MIDODRINE HYDROCHLORIDE 2.5 MG/1
5 TABLET ORAL EVERY 8 HOURS
Qty: 0 | Refills: 0 | Status: DISCONTINUED | OUTPATIENT
Start: 2018-06-04 | End: 2018-06-06

## 2018-06-04 RX ORDER — POTASSIUM CHLORIDE 20 MEQ
20 PACKET (EA) ORAL
Qty: 0 | Refills: 0 | Status: COMPLETED | OUTPATIENT
Start: 2018-06-04 | End: 2018-06-04

## 2018-06-04 RX ORDER — SODIUM CHLORIDE 9 MG/ML
1000 INJECTION, SOLUTION INTRAVENOUS ONCE
Qty: 0 | Refills: 0 | Status: COMPLETED | OUTPATIENT
Start: 2018-06-04 | End: 2018-06-04

## 2018-06-04 RX ORDER — PROPOFOL 10 MG/ML
5 INJECTION, EMULSION INTRAVENOUS
Qty: 500 | Refills: 0 | Status: DISCONTINUED | OUTPATIENT
Start: 2018-06-04 | End: 2018-06-06

## 2018-06-04 RX ADMIN — Medication 750 MILLIGRAM(S): at 06:00

## 2018-06-04 RX ADMIN — PIPERACILLIN AND TAZOBACTAM 25 GRAM(S): 4; .5 INJECTION, POWDER, LYOPHILIZED, FOR SOLUTION INTRAVENOUS at 17:39

## 2018-06-04 RX ADMIN — Medication 3 MILLILITER(S): at 05:14

## 2018-06-04 RX ADMIN — PRIMIDONE 100 MILLIGRAM(S): 250 TABLET ORAL at 17:39

## 2018-06-04 RX ADMIN — SODIUM CHLORIDE 1000 MILLILITER(S): 9 INJECTION, SOLUTION INTRAVENOUS at 09:09

## 2018-06-04 RX ADMIN — HEPARIN SODIUM 5000 UNIT(S): 5000 INJECTION INTRAVENOUS; SUBCUTANEOUS at 17:39

## 2018-06-04 RX ADMIN — Medication 750 MILLIGRAM(S): at 21:37

## 2018-06-04 RX ADMIN — LIDOCAINE 1 PATCH: 4 CREAM TOPICAL at 09:08

## 2018-06-04 RX ADMIN — PROPOFOL 1.23 MICROGRAM(S)/KG/MIN: 10 INJECTION, EMULSION INTRAVENOUS at 12:34

## 2018-06-04 RX ADMIN — PIPERACILLIN AND TAZOBACTAM 25 GRAM(S): 4; .5 INJECTION, POWDER, LYOPHILIZED, FOR SOLUTION INTRAVENOUS at 09:08

## 2018-06-04 RX ADMIN — Medication 1 DROP(S): at 15:57

## 2018-06-04 RX ADMIN — MONTELUKAST 10 MILLIGRAM(S): 4 TABLET, CHEWABLE ORAL at 12:50

## 2018-06-04 RX ADMIN — POLYETHYLENE GLYCOL 3350 17 GRAM(S): 17 POWDER, FOR SOLUTION ORAL at 06:00

## 2018-06-04 RX ADMIN — Medication 50 MILLIEQUIVALENT(S): at 05:03

## 2018-06-04 RX ADMIN — MIDODRINE HYDROCHLORIDE 5 MILLIGRAM(S): 2.5 TABLET ORAL at 13:28

## 2018-06-04 RX ADMIN — PIPERACILLIN AND TAZOBACTAM 25 GRAM(S): 4; .5 INJECTION, POWDER, LYOPHILIZED, FOR SOLUTION INTRAVENOUS at 00:57

## 2018-06-04 RX ADMIN — LIDOCAINE 1 PATCH: 4 CREAM TOPICAL at 21:30

## 2018-06-04 RX ADMIN — MIDODRINE HYDROCHLORIDE 5 MILLIGRAM(S): 2.5 TABLET ORAL at 06:00

## 2018-06-04 RX ADMIN — Medication 2: at 05:59

## 2018-06-04 RX ADMIN — Medication 50 MILLIEQUIVALENT(S): at 03:59

## 2018-06-04 RX ADMIN — Medication 1 DROP(S): at 05:59

## 2018-06-04 RX ADMIN — Medication 2: at 01:08

## 2018-06-04 RX ADMIN — Medication 1 DROP(S): at 21:38

## 2018-06-04 RX ADMIN — Medication 50 MILLIGRAM(S): at 06:00

## 2018-06-04 RX ADMIN — MIDODRINE HYDROCHLORIDE 5 MILLIGRAM(S): 2.5 TABLET ORAL at 21:37

## 2018-06-04 RX ADMIN — PRIMIDONE 100 MILLIGRAM(S): 250 TABLET ORAL at 06:00

## 2018-06-04 RX ADMIN — Medication 750 MILLIGRAM(S): at 15:56

## 2018-06-04 RX ADMIN — HEPARIN SODIUM 5000 UNIT(S): 5000 INJECTION INTRAVENOUS; SUBCUTANEOUS at 06:00

## 2018-06-04 NOTE — PROGRESS NOTE ADULT - SUBJECTIVE AND OBJECTIVE BOX
INTERVAL HPI/OVERNIGHT EVENTS:    SUBJECTIVE: Patient seen and examined at bedside.       OBJECTIVE:    VITAL SIGNS:  ICU Vital Signs Last 24 Hrs  T(C): 36.9 (04 Jun 2018 04:00), Max: 37.3 (03 Jun 2018 12:00)  T(F): 98.4 (04 Jun 2018 04:00), Max: 99.1 (03 Jun 2018 12:00)  HR: 115 (04 Jun 2018 07:00) (88 - 133)  BP: 112/81 (04 Jun 2018 07:00) (71/46 - 155/88)  BP(mean): 92 (04 Jun 2018 07:00) (54 - 110)  ABP: --  ABP(mean): --  RR: 19 (04 Jun 2018 07:00) (0 - 23)  SpO2: 97% (04 Jun 2018 07:00) (96% - 100%)    Mode: AC/ CMV (Assist Control/ Continuous Mandatory Ventilation), RR (machine): 16, TV (machine): 350, FiO2: 30, PEEP: 6, ITime: 1, MAP: 10, PIP: 31    06-03 @ 07:01  -  06-04 @ 07:00  --------------------------------------------------------  IN: 2401.5 mL / OUT: 990 mL / NET: 1411.5 mL      CAPILLARY BLOOD GLUCOSE      POCT Blood Glucose.: 201 mg/dL (04 Jun 2018 05:47)      PHYSICAL EXAM:        MEDICATIONS:  MEDICATIONS  (STANDING):  ALBUTerol/ipratropium for Nebulization 3 milliLiter(s) Nebulizer every 6 hours  artificial  tears Solution 1 Drop(s) Both EYES three times a day  dexmedetomidine Infusion 0.1 MICROgram(s)/kG/Hr (1.025 mL/Hr) IV Continuous <Continuous>  heparin  Injectable 5000 Unit(s) SubCutaneous every 12 hours  hydrocortisone sodium succinate Injectable 50 milliGRAM(s) IV Push every 8 hours  insulin lispro (HumaLOG) corrective regimen sliding scale   SubCutaneous every 6 hours  midodrine 5 milliGRAM(s) Oral every 8 hours  montelukast 10 milliGRAM(s) Oral daily  phenylephrine    Infusion 1 MICROgram(s)/kG/Min (15.375 mL/Hr) IV Continuous <Continuous>  piperacillin/tazobactam IVPB. 3.375 Gram(s) IV Intermittent every 8 hours  polyethylene glycol 3350 17 Gram(s) Oral two times a day  primidone 100 milliGRAM(s) Oral every 12 hours  senna Syrup 10 milliLiter(s) Oral at bedtime  tiotropium 18 MICROgram(s) Capsule 1 Capsule(s) Inhalation daily  valproic  acid Syrup 750 milliGRAM(s) Oral every 8 hours    MEDICATIONS  (PRN):      ALLERGIES:  Allergies    No Known Allergies    Intolerances        LABS:                        12.3   21.6  )-----------( 233      ( 04 Jun 2018 01:31 )             36.1     06-04    135  |  96  |  9   ----------------------------<  232<H>  3.4<L>   |  30  |  <0.30<L>    Ca    8.6      04 Jun 2018 01:31  Phos  2.7     06-04  Mg     2.1     06-04    TPro  5.4<L>  /  Alb  2.6<L>  /  TBili  0.2  /  DBili  x   /  AST  28  /  ALT  27  /  AlkPhos  76  06-04    PT/INR - ( 04 Jun 2018 01:31 )   PT: 11.3 sec;   INR: 1.05 ratio         PTT - ( 04 Jun 2018 01:31 )  PTT:33.6 sec      RADIOLOGY & ADDITIONAL TESTS: Reviewed. INTERVAL HPI/OVERNIGHT EVENTS:    SUBJECTIVE: Patient seen and examined at bedside. Unable to assess ROS due to mental status and intubated.       OBJECTIVE:    VITAL SIGNS:  ICU Vital Signs Last 24 Hrs  T(C): 36.9 (04 Jun 2018 04:00), Max: 37.3 (03 Jun 2018 12:00)  T(F): 98.4 (04 Jun 2018 04:00), Max: 99.1 (03 Jun 2018 12:00)  HR: 115 (04 Jun 2018 07:00) (88 - 133)  BP: 112/81 (04 Jun 2018 07:00) (71/46 - 155/88)  BP(mean): 92 (04 Jun 2018 07:00) (54 - 110)  ABP: --  ABP(mean): --  RR: 19 (04 Jun 2018 07:00) (0 - 23)  SpO2: 97% (04 Jun 2018 07:00) (96% - 100%)    Mode: AC/ CMV (Assist Control/ Continuous Mandatory Ventilation), RR (machine): 16, TV (machine): 350, FiO2: 30, PEEP: 6, ITime: 1, MAP: 10, PIP: 31    06-03 @ 07:01  -  06-04 @ 07:00  --------------------------------------------------------  IN: 2401.5 mL / OUT: 990 mL / NET: 1411.5 mL      CAPILLARY BLOOD GLUCOSE      POCT Blood Glucose.: 201 mg/dL (04 Jun 2018 05:47)      PHYSICAL EXAM:  General: NAD, intubated and sedated  HEENT: NC/AT; PERRL, clear conjunctiva  Neck: supple  Respiratory: CTA b/l  Cardiovascular: +S1/S2; RRR  Abdomen: soft, NT/ND; +BS x4  Extremities: WWP, 2+ peripheral pulses b/l; no LE edema  Skin: normal color and turgor; no rash  Neurological: Sedated      MEDICATIONS:  MEDICATIONS  (STANDING):  ALBUTerol/ipratropium for Nebulization 3 milliLiter(s) Nebulizer every 6 hours  artificial  tears Solution 1 Drop(s) Both EYES three times a day  dexmedetomidine Infusion 0.1 MICROgram(s)/kG/Hr (1.025 mL/Hr) IV Continuous <Continuous>  heparin  Injectable 5000 Unit(s) SubCutaneous every 12 hours  hydrocortisone sodium succinate Injectable 50 milliGRAM(s) IV Push every 8 hours  insulin lispro (HumaLOG) corrective regimen sliding scale   SubCutaneous every 6 hours  midodrine 5 milliGRAM(s) Oral every 8 hours  montelukast 10 milliGRAM(s) Oral daily  phenylephrine    Infusion 1 MICROgram(s)/kG/Min (15.375 mL/Hr) IV Continuous <Continuous>  piperacillin/tazobactam IVPB. 3.375 Gram(s) IV Intermittent every 8 hours  polyethylene glycol 3350 17 Gram(s) Oral two times a day  primidone 100 milliGRAM(s) Oral every 12 hours  senna Syrup 10 milliLiter(s) Oral at bedtime  tiotropium 18 MICROgram(s) Capsule 1 Capsule(s) Inhalation daily  valproic  acid Syrup 750 milliGRAM(s) Oral every 8 hours    MEDICATIONS  (PRN):      ALLERGIES:  Allergies    No Known Allergies    Intolerances        LABS:                        12.3   21.6  )-----------( 233      ( 04 Jun 2018 01:31 )             36.1     06-04    135  |  96  |  9   ----------------------------<  232<H>  3.4<L>   |  30  |  <0.30<L>    Ca    8.6      04 Jun 2018 01:31  Phos  2.7     06-04  Mg     2.1     06-04    TPro  5.4<L>  /  Alb  2.6<L>  /  TBili  0.2  /  DBili  x   /  AST  28  /  ALT  27  /  AlkPhos  76  06-04    PT/INR - ( 04 Jun 2018 01:31 )   PT: 11.3 sec;   INR: 1.05 ratio         PTT - ( 04 Jun 2018 01:31 )  PTT:33.6 sec      RADIOLOGY & ADDITIONAL TESTS: Reviewed.

## 2018-06-04 NOTE — EEG REPORT - NS EEG TEXT BOX
Study Date: 06-03-18 - 06-04-18  _____________________________________________________________  HISTORY  Patient is a 27y old  Male who presents with a chief complaint of sepsis/ cardiac arrest (01 Jun 2018 17:37)      PERTINENT MEDICATION:  Precedex  valproic  acid Syrup 750 milliGRAM(s) Oral every 8 hours    _____________________________________________________________  STUDY INTERPRETATION    Findings:  The background was spontaneously variable, non-reactive, and in burst attenuation that initially consisted of 1-3s of diffuse theta/delta alternating with minutes (up to >1hour) of inter-burst interval. Over the course of the recording, the interburst interval shortened to 5-10 seconds.  No posterior dominant rhythm seen.  At times there are periods with eyes open and blinking.     Focal Slowing:   None was present.    Sleep Background:  Stage II sleep transients were not recorded.    Other Non-Epileptiform Findings:  None were present.    Interictal Epileptiform Activity:   None were present.    Events:  Clinical events: None recorded.  Seizures: None recorded.    Activation Procedures:   Hyperventilation was not performed.    Photic stimulation was not performed.     Artifacts:  Intermittent myogenic and movement artifacts were noted.    ECG:  The heart rate on single channel ECG was predominantly between 80-90 BPM.    _____________________________________________________________  EEG SUMMARY/CLASSIFICATION    Abnormal EEG  - Severe generalized slowing with burst attenuationbackground.    _____________________________________________________________  EEG IMPRESSION/CLINICAL CORRELATE    Abnormal EEG study.  1. Severe nonspecific diffuse or multifocal cerebral dysfunction, which may be at least in part due to sedation.  2. No epileptiform pattern or seizure seen.      Jero Juarez MD  Attending Physician, [facility] [place]

## 2018-06-04 NOTE — PROGRESS NOTE ADULT - ASSESSMENT
Assessment and Plan:   · Assessment		  27 yr old male PMHx of MRCP, non verbal, wheel chair bound, Asthma (reactive airway dz), Seizure disorder, scoliosis, aspiration pna in past, recent PEG replacement  april/2018 brought in to ED by parents from home with progressive/worsening  resp distress over two day period found to have viral septic shock requiring pressor support c/b acute hypoxemic respiratory failure requiring intubation.    #Neuro:  -neuro checks q2 hrs and prn for changes  -24 EEG no seizure activity  -Will stop EEG today   -continue with AED valproic acid  -ativan IVP first line for sz activity  -sz precautions, CT head no structural abnormalities    #Pulm:  -mechanical vent therapy - titrate to maintain ph 7.35-7.45; PCO2 35-45; SPO2 > 92%  -duoneb therapy q 6 hrs  -CTA chest revealed multifocal left lobar PNA, no PE  -HOB >/= 30 degree angle  -chest pt q2 hrs  -Will attempt trial to wean off vent today / extubation     #CV:  -will begin fluids to improve HR / BP  -hyperdynamic LV on bedside U/S  -TTE (6/3) with grossly normal left ventricular systolic function    #GI/:  - Pt with peg  - Xray abdomen confirmed PEG placement  - will resume tube feeds  - strict I & O's - keep even  - protonix 40 mg IV q.d.    #ID:  - pan culture  - RVP revealed enterorhinovirus, c/w supportive measures  - discontinue zithromax as urine legionella negative  - continue zosyn 3.375 gm IV q 8 hrs  - d/c hydrocortisone     #FEN/ENDO/HEME:  - Na back at baseline , now 134   - POC glucose q 4 hr with ISS - maintain glucose 140 to 160    -Lidocaine patch on chest     -DVT prophylaxis - PAS stockings    Mikie Logan MD, PGY1

## 2018-06-05 LAB
ALBUMIN SERPL ELPH-MCNC: 2.4 G/DL — LOW (ref 3.3–5)
ALP SERPL-CCNC: 73 U/L — SIGNIFICANT CHANGE UP (ref 40–120)
ALT FLD-CCNC: 22 U/L — SIGNIFICANT CHANGE UP (ref 10–45)
ANION GAP SERPL CALC-SCNC: 13 MMOL/L — SIGNIFICANT CHANGE UP (ref 5–17)
ANION GAP SERPL CALC-SCNC: 8 MMOL/L — SIGNIFICANT CHANGE UP (ref 5–17)
APTT BLD: 30 SEC — SIGNIFICANT CHANGE UP (ref 27.5–37.4)
APTT BLD: 34.5 SEC — SIGNIFICANT CHANGE UP (ref 27.5–37.4)
AST SERPL-CCNC: 38 U/L — SIGNIFICANT CHANGE UP (ref 10–40)
BILIRUB SERPL-MCNC: 0.2 MG/DL — SIGNIFICANT CHANGE UP (ref 0.2–1.2)
BUN SERPL-MCNC: 10 MG/DL — SIGNIFICANT CHANGE UP (ref 7–23)
BUN SERPL-MCNC: 11 MG/DL — SIGNIFICANT CHANGE UP (ref 7–23)
CALCIUM SERPL-MCNC: 8.5 MG/DL — SIGNIFICANT CHANGE UP (ref 8.4–10.5)
CALCIUM SERPL-MCNC: 8.5 MG/DL — SIGNIFICANT CHANGE UP (ref 8.4–10.5)
CHLORIDE SERPL-SCNC: 94 MMOL/L — LOW (ref 96–108)
CHLORIDE SERPL-SCNC: 95 MMOL/L — LOW (ref 96–108)
CO2 SERPL-SCNC: 30 MMOL/L — SIGNIFICANT CHANGE UP (ref 22–31)
CO2 SERPL-SCNC: 31 MMOL/L — SIGNIFICANT CHANGE UP (ref 22–31)
CREAT SERPL-MCNC: <0.3 MG/DL — LOW (ref 0.5–1.3)
CREAT SERPL-MCNC: <0.3 MG/DL — LOW (ref 0.5–1.3)
GAS PNL BLDA: SIGNIFICANT CHANGE UP
GAS PNL BLDA: SIGNIFICANT CHANGE UP
GLUCOSE BLDC GLUCOMTR-MCNC: 109 MG/DL — HIGH (ref 70–99)
GLUCOSE BLDC GLUCOMTR-MCNC: 123 MG/DL — HIGH (ref 70–99)
GLUCOSE BLDC GLUCOMTR-MCNC: 132 MG/DL — HIGH (ref 70–99)
GLUCOSE BLDC GLUCOMTR-MCNC: 176 MG/DL — HIGH (ref 70–99)
GLUCOSE BLDC GLUCOMTR-MCNC: 202 MG/DL — HIGH (ref 70–99)
GLUCOSE SERPL-MCNC: 134 MG/DL — HIGH (ref 70–99)
GLUCOSE SERPL-MCNC: 192 MG/DL — HIGH (ref 70–99)
HCT VFR BLD CALC: 35.3 % — LOW (ref 39–50)
HCT VFR BLD CALC: 38 % — LOW (ref 39–50)
HGB BLD-MCNC: 12.1 G/DL — LOW (ref 13–17)
HGB BLD-MCNC: 13.1 G/DL — SIGNIFICANT CHANGE UP (ref 13–17)
INR BLD: 1.08 RATIO — SIGNIFICANT CHANGE UP (ref 0.88–1.16)
INR BLD: 1.14 RATIO — SIGNIFICANT CHANGE UP (ref 0.88–1.16)
MAGNESIUM SERPL-MCNC: 2 MG/DL — SIGNIFICANT CHANGE UP (ref 1.6–2.6)
MAGNESIUM SERPL-MCNC: 2 MG/DL — SIGNIFICANT CHANGE UP (ref 1.6–2.6)
MCHC RBC-ENTMCNC: 31.8 PG — SIGNIFICANT CHANGE UP (ref 27–34)
MCHC RBC-ENTMCNC: 32.1 PG — SIGNIFICANT CHANGE UP (ref 27–34)
MCHC RBC-ENTMCNC: 34.2 GM/DL — SIGNIFICANT CHANGE UP (ref 32–36)
MCHC RBC-ENTMCNC: 34.5 GM/DL — SIGNIFICANT CHANGE UP (ref 32–36)
MCV RBC AUTO: 93 FL — SIGNIFICANT CHANGE UP (ref 80–100)
MCV RBC AUTO: 93.1 FL — SIGNIFICANT CHANGE UP (ref 80–100)
PHOSPHATE SERPL-MCNC: 2.6 MG/DL — SIGNIFICANT CHANGE UP (ref 2.5–4.5)
PHOSPHATE SERPL-MCNC: 4.7 MG/DL — HIGH (ref 2.5–4.5)
PLATELET # BLD AUTO: 222 K/UL — SIGNIFICANT CHANGE UP (ref 150–400)
PLATELET # BLD AUTO: 241 K/UL — SIGNIFICANT CHANGE UP (ref 150–400)
POTASSIUM SERPL-MCNC: 4.2 MMOL/L — SIGNIFICANT CHANGE UP (ref 3.5–5.3)
POTASSIUM SERPL-MCNC: 4.4 MMOL/L — SIGNIFICANT CHANGE UP (ref 3.5–5.3)
POTASSIUM SERPL-SCNC: 4.2 MMOL/L — SIGNIFICANT CHANGE UP (ref 3.5–5.3)
POTASSIUM SERPL-SCNC: 4.4 MMOL/L — SIGNIFICANT CHANGE UP (ref 3.5–5.3)
PROT SERPL-MCNC: 5.3 G/DL — LOW (ref 6–8.3)
PROTHROM AB SERPL-ACNC: 11.8 SEC — SIGNIFICANT CHANGE UP (ref 9.8–12.7)
PROTHROM AB SERPL-ACNC: 12.4 SEC — SIGNIFICANT CHANGE UP (ref 9.8–12.7)
RBC # BLD: 3.79 M/UL — LOW (ref 4.2–5.8)
RBC # BLD: 4.08 M/UL — LOW (ref 4.2–5.8)
RBC # FLD: 12.3 % — SIGNIFICANT CHANGE UP (ref 10.3–14.5)
RBC # FLD: 12.4 % — SIGNIFICANT CHANGE UP (ref 10.3–14.5)
SODIUM SERPL-SCNC: 133 MMOL/L — LOW (ref 135–145)
SODIUM SERPL-SCNC: 138 MMOL/L — SIGNIFICANT CHANGE UP (ref 135–145)
WBC # BLD: 22.7 K/UL — HIGH (ref 3.8–10.5)
WBC # BLD: 37 K/UL — HIGH (ref 3.8–10.5)
WBC # FLD AUTO: 22.7 K/UL — HIGH (ref 3.8–10.5)
WBC # FLD AUTO: 37 K/UL — HIGH (ref 3.8–10.5)

## 2018-06-05 PROCEDURE — 99291 CRITICAL CARE FIRST HOUR: CPT

## 2018-06-05 PROCEDURE — 93010 ELECTROCARDIOGRAM REPORT: CPT

## 2018-06-05 PROCEDURE — 71045 X-RAY EXAM CHEST 1 VIEW: CPT | Mod: 26

## 2018-06-05 RX ORDER — AMIODARONE HYDROCHLORIDE 400 MG/1
1 TABLET ORAL
Qty: 900 | Refills: 0 | Status: DISCONTINUED | OUTPATIENT
Start: 2018-06-05 | End: 2018-06-06

## 2018-06-05 RX ORDER — AMIODARONE HYDROCHLORIDE 400 MG/1
0.5 TABLET ORAL
Qty: 900 | Refills: 0 | Status: DISCONTINUED | OUTPATIENT
Start: 2018-06-05 | End: 2018-06-06

## 2018-06-05 RX ORDER — NOREPINEPHRINE BITARTRATE/D5W 8 MG/250ML
0.05 PLASTIC BAG, INJECTION (ML) INTRAVENOUS
Qty: 8 | Refills: 0 | Status: DISCONTINUED | OUTPATIENT
Start: 2018-06-05 | End: 2018-06-06

## 2018-06-05 RX ORDER — SODIUM CHLORIDE 9 MG/ML
500 INJECTION INTRAMUSCULAR; INTRAVENOUS; SUBCUTANEOUS ONCE
Qty: 0 | Refills: 0 | Status: COMPLETED | OUTPATIENT
Start: 2018-06-05 | End: 2018-06-05

## 2018-06-05 RX ADMIN — PIPERACILLIN AND TAZOBACTAM 25 GRAM(S): 4; .5 INJECTION, POWDER, LYOPHILIZED, FOR SOLUTION INTRAVENOUS at 17:03

## 2018-06-05 RX ADMIN — Medication 2 MILLIGRAM(S): at 20:35

## 2018-06-05 RX ADMIN — Medication 3 MILLILITER(S): at 05:36

## 2018-06-05 RX ADMIN — SODIUM CHLORIDE 3000 MILLILITER(S): 9 INJECTION INTRAMUSCULAR; INTRAVENOUS; SUBCUTANEOUS at 15:27

## 2018-06-05 RX ADMIN — Medication 1: at 00:49

## 2018-06-05 RX ADMIN — PIPERACILLIN AND TAZOBACTAM 25 GRAM(S): 4; .5 INJECTION, POWDER, LYOPHILIZED, FOR SOLUTION INTRAVENOUS at 00:50

## 2018-06-05 RX ADMIN — DEXMEDETOMIDINE HYDROCHLORIDE IN 0.9% SODIUM CHLORIDE 1.02 MICROGRAM(S)/KG/HR: 4 INJECTION INTRAVENOUS at 08:30

## 2018-06-05 RX ADMIN — Medication 1 DROP(S): at 05:07

## 2018-06-05 RX ADMIN — HEPARIN SODIUM 5000 UNIT(S): 5000 INJECTION INTRAVENOUS; SUBCUTANEOUS at 17:03

## 2018-06-05 RX ADMIN — Medication 3 MILLILITER(S): at 17:15

## 2018-06-05 RX ADMIN — Medication 750 MILLIGRAM(S): at 22:11

## 2018-06-05 RX ADMIN — HEPARIN SODIUM 5000 UNIT(S): 5000 INJECTION INTRAVENOUS; SUBCUTANEOUS at 05:06

## 2018-06-05 RX ADMIN — Medication 1 DROP(S): at 22:12

## 2018-06-05 RX ADMIN — PRIMIDONE 100 MILLIGRAM(S): 250 TABLET ORAL at 17:03

## 2018-06-05 RX ADMIN — Medication 2: at 05:24

## 2018-06-05 RX ADMIN — MIDODRINE HYDROCHLORIDE 5 MILLIGRAM(S): 2.5 TABLET ORAL at 13:30

## 2018-06-05 RX ADMIN — PIPERACILLIN AND TAZOBACTAM 25 GRAM(S): 4; .5 INJECTION, POWDER, LYOPHILIZED, FOR SOLUTION INTRAVENOUS at 08:45

## 2018-06-05 RX ADMIN — LIDOCAINE 1 PATCH: 4 CREAM TOPICAL at 12:01

## 2018-06-05 RX ADMIN — Medication 3 MILLILITER(S): at 11:16

## 2018-06-05 RX ADMIN — MIDODRINE HYDROCHLORIDE 5 MILLIGRAM(S): 2.5 TABLET ORAL at 22:12

## 2018-06-05 RX ADMIN — AMIODARONE HYDROCHLORIDE 33.33 MG/MIN: 400 TABLET ORAL at 22:11

## 2018-06-05 RX ADMIN — Medication 1 DROP(S): at 13:30

## 2018-06-05 RX ADMIN — MIDODRINE HYDROCHLORIDE 5 MILLIGRAM(S): 2.5 TABLET ORAL at 05:06

## 2018-06-05 RX ADMIN — PRIMIDONE 100 MILLIGRAM(S): 250 TABLET ORAL at 05:05

## 2018-06-05 RX ADMIN — Medication 3.84 MICROGRAM(S)/KG/MIN: at 15:45

## 2018-06-05 RX ADMIN — Medication 750 MILLIGRAM(S): at 05:07

## 2018-06-05 RX ADMIN — Medication 750 MILLIGRAM(S): at 13:30

## 2018-06-05 NOTE — PROGRESS NOTE ADULT - SUBJECTIVE AND OBJECTIVE BOX
INTERVAL HPI/OVERNIGHT EVENTS:  No acute events overnight, patient remains sedated and intubated.     SUBJECTIVE: Patient seen and examined at bedside. Unable to assess ROS due to mental status and intubated.     OBJECTIVE:    VITAL SIGNS:  ICU Vital Signs Last 24 Hrs  T(C): 36.6 (05 Jun 2018 04:00), Max: 37.9 (04 Jun 2018 12:00)  T(F): 97.9 (05 Jun 2018 04:00), Max: 100.2 (04 Jun 2018 12:00)  HR: 110 (05 Jun 2018 06:00) (99 - 156)  BP: 105/69 (05 Jun 2018 06:00) (91/53 - 144/69)  BP(mean): 80 (05 Jun 2018 06:00) (62 - 106)  ABP: --  ABP(mean): --  RR: 20 (05 Jun 2018 06:00) (17 - 31)  SpO2: 98% (05 Jun 2018 06:00) (92% - 100%)    Mode: AC/ CMV (Assist Control/ Continuous Mandatory Ventilation), RR (machine): 16, TV (machine): 350, FiO2: 40, PEEP: 6, ITime: 1, MAP: 13, PC: 3, PIP: 30    06-04 @ 07:01  -  06-05 @ 07:00  --------------------------------------------------------  IN: 2435.1 mL / OUT: 900 mL / NET: 1535.1 mL      CAPILLARY BLOOD GLUCOSE      POCT Blood Glucose.: 202 mg/dL (05 Jun 2018 05:23)      PHYSICAL EXAM:    General: NAD, intubated and sedated  HEENT: NC/AT; PERRL, clear conjunctiva  Neck: supple  Respiratory: CTA b/l  Cardiovascular: +S1/S2; RRR  Abdomen: soft, NT/ND; +BS x4  Extremities: WWP, 2+ peripheral pulses b/l; no LE edema  Skin: normal color and turgor; no rash  Neurological: Sedated    MEDICATIONS:  MEDICATIONS  (STANDING):  ALBUTerol/ipratropium for Nebulization 3 milliLiter(s) Nebulizer every 6 hours  artificial  tears Solution 1 Drop(s) Both EYES three times a day  dexmedetomidine Infusion 0.1 MICROgram(s)/kG/Hr (1.025 mL/Hr) IV Continuous <Continuous>  heparin  Injectable 5000 Unit(s) SubCutaneous every 12 hours  insulin lispro (HumaLOG) corrective regimen sliding scale   SubCutaneous every 6 hours  lidocaine   Patch 1 Patch Transdermal daily  midodrine 5 milliGRAM(s) Oral every 8 hours  montelukast 10 milliGRAM(s) Oral daily  piperacillin/tazobactam IVPB. 3.375 Gram(s) IV Intermittent every 8 hours  polyethylene glycol 3350 17 Gram(s) Oral two times a day  primidone 100 milliGRAM(s) Oral every 12 hours  propofol Infusion 5 MICROgram(s)/kG/Min (1.23 mL/Hr) IV Continuous <Continuous>  senna Syrup 10 milliLiter(s) Oral at bedtime  tiotropium 18 MICROgram(s) Capsule 1 Capsule(s) Inhalation daily  valproic  acid Syrup 750 milliGRAM(s) Oral every 8 hours    MEDICATIONS  (PRN):      ALLERGIES:  Allergies    No Known Allergies    Intolerances        LABS:                        12.1   22.7  )-----------( 222      ( 05 Jun 2018 01:21 )             35.3     06-05    133<L>  |  95<L>  |  11  ----------------------------<  192<H>  4.2   |  30  |  <0.30<L>    Ca    8.5      05 Jun 2018 01:21  Phos  2.6     06-05  Mg     2.0     06-05    TPro  5.3<L>  /  Alb  2.4<L>  /  TBili  0.2  /  DBili  x   /  AST  38  /  ALT  22  /  AlkPhos  73  06-05    PT/INR - ( 05 Jun 2018 01:21 )   PT: 11.8 sec;   INR: 1.08 ratio         PTT - ( 05 Jun 2018 01:21 )  PTT:30.0 sec      RADIOLOGY & ADDITIONAL TESTS: Reviewed.

## 2018-06-05 NOTE — PROVIDER CONTACT NOTE (OTHER) - ACTION/TREATMENT ORDERED:
EKG preformed. 500 cc NS bolus given as ordered. Pt started on Levophed drip at 0.01 mcg/hr.  Will continue to monitor

## 2018-06-05 NOTE — CHART NOTE - NSCHARTNOTEFT_GEN_A_CORE
Pt in VT arrest with no pulse, chest compressions started immediately. Pt received Epi x 2, sodium bicarb x 1 and shock 150J x 2 with ROSC in 6 minutes. Pt's mom at bedside, updated about the event. Dr. Whiting at bedside. Pt started on amiodarone drip post code. Will monitor closely. Pt in VT arrest with no pulse, chest compressions started immediately. Pt received Epi x 2, sodium bicarb x 1 and shock 150J x 2, amiodarone 150mg x 1 with ROSC in 6 minutes. Pt's mom at bedside, updated about the event. Dr. Whiting at bedside. Pt started on amiodarone drip post code. Will monitor closely.

## 2018-06-05 NOTE — PROGRESS NOTE ADULT - ASSESSMENT
Assessment and Plan:   · Assessment		  27 yr old male PMHx of MRCP, non verbal, wheel chair bound, Asthma (reactive airway dz), Seizure disorder, scoliosis, aspiration pna in past, recent PEG replacement  april/2018 brought in to ED by parents from home with progressive/worsening  resp distress over two day period found to have viral septic shock requiring pressor support c/b acute hypoxemic respiratory failure requiring intubation.    #Neuro:  -neuro checks q2 hrs and prn for changes  -24 EEG no seizure activity  -continue with AED valproic acid  -ativan IVP first line for sz activity  -sz precautions, CT head no structural abnormalities    #Pulm:  -mechanical vent therapy - titrate to maintain ph 7.35-7.45; PCO2 35-45; SPO2 > 92%  -duoneb therapy q 6 hrs  -CTA chest revealed multifocal left lobar PNA, no PE  -HOB >/= 30 degree angle  -chest pt q2 hrs  -failed weaning trial 6/4/18  -Will re-attempt trial to wean off vent today / extubation     #CV:  -will continue to assess need for additional fluids to improve HR / BP  -hyperdynamic LV on bedside U/S on 6/4/18  -TTE (6/3) with grossly normal left ventricular systolic function    #GI/:  - Pt with peg  - Xray abdomen confirmed PEG placement  - will resume tube feeds  - strict I & O's - keep even  - protonix 40 mg IV q.d.    #ID:  - pan culture  - RVP revealed enterorhinovirus, c/w supportive measures  - discontinue zithromax as urine legionella negative  - continue zosyn 3.375 gm IV q 8 hrs  - d/c hydrocortisone as of 6/4/18    #FEN/ENDO/HEME:  - Na back at baseline   - POC glucose q 4 hr with ISS - maintain glucose 140 to 160    -Lidocaine patch on chest     -DVT prophylaxis - PAS stockings    Mikie Logan MD, PGY1

## 2018-06-06 DIAGNOSIS — R53.2 FUNCTIONAL QUADRIPLEGIA: ICD-10-CM

## 2018-06-06 DIAGNOSIS — F79 UNSPECIFIED INTELLECTUAL DISABILITIES: ICD-10-CM

## 2018-06-06 DIAGNOSIS — G80.9 CEREBRAL PALSY, UNSPECIFIED: ICD-10-CM

## 2018-06-06 DIAGNOSIS — Z71.89 OTHER SPECIFIED COUNSELING: ICD-10-CM

## 2018-06-06 DIAGNOSIS — J96.90 RESPIRATORY FAILURE, UNSPECIFIED, UNSPECIFIED WHETHER WITH HYPOXIA OR HYPERCAPNIA: ICD-10-CM

## 2018-06-06 DIAGNOSIS — I46.9 CARDIAC ARREST, CAUSE UNSPECIFIED: ICD-10-CM

## 2018-06-06 LAB
ALBUMIN SERPL ELPH-MCNC: 2.4 G/DL — LOW (ref 3.3–5)
ALP SERPL-CCNC: 64 U/L — SIGNIFICANT CHANGE UP (ref 40–120)
ALT FLD-CCNC: 18 U/L — SIGNIFICANT CHANGE UP (ref 10–45)
ANION GAP SERPL CALC-SCNC: 9 MMOL/L — SIGNIFICANT CHANGE UP (ref 5–17)
APTT BLD: 30.2 SEC — SIGNIFICANT CHANGE UP (ref 27.5–37.4)
AST SERPL-CCNC: 55 U/L — HIGH (ref 10–40)
BILIRUB SERPL-MCNC: 0.2 MG/DL — SIGNIFICANT CHANGE UP (ref 0.2–1.2)
BUN SERPL-MCNC: 9 MG/DL — SIGNIFICANT CHANGE UP (ref 7–23)
CALCIUM SERPL-MCNC: 8.3 MG/DL — LOW (ref 8.4–10.5)
CHLORIDE SERPL-SCNC: 95 MMOL/L — LOW (ref 96–108)
CK MB CFR SERPL CALC: 2.8 NG/ML — SIGNIFICANT CHANGE UP (ref 0–6.7)
CK SERPL-CCNC: 88 U/L — SIGNIFICANT CHANGE UP (ref 30–200)
CO2 SERPL-SCNC: 31 MMOL/L — SIGNIFICANT CHANGE UP (ref 22–31)
CREAT SERPL-MCNC: <0.3 MG/DL — LOW (ref 0.5–1.3)
CULTURE RESULTS: SIGNIFICANT CHANGE UP
GLUCOSE BLDC GLUCOMTR-MCNC: 108 MG/DL — HIGH (ref 70–99)
GLUCOSE BLDC GLUCOMTR-MCNC: 113 MG/DL — HIGH (ref 70–99)
GLUCOSE SERPL-MCNC: 163 MG/DL — HIGH (ref 70–99)
HCT VFR BLD CALC: 33 % — LOW (ref 39–50)
HGB BLD-MCNC: 11.7 G/DL — LOW (ref 13–17)
INR BLD: 1.15 RATIO — SIGNIFICANT CHANGE UP (ref 0.88–1.16)
MAGNESIUM SERPL-MCNC: 1.9 MG/DL — SIGNIFICANT CHANGE UP (ref 1.6–2.6)
MCHC RBC-ENTMCNC: 32.8 PG — SIGNIFICANT CHANGE UP (ref 27–34)
MCHC RBC-ENTMCNC: 35.3 GM/DL — SIGNIFICANT CHANGE UP (ref 32–36)
MCV RBC AUTO: 92.9 FL — SIGNIFICANT CHANGE UP (ref 80–100)
PHOSPHATE SERPL-MCNC: 3.8 MG/DL — SIGNIFICANT CHANGE UP (ref 2.5–4.5)
PLATELET # BLD AUTO: 205 K/UL — SIGNIFICANT CHANGE UP (ref 150–400)
POTASSIUM SERPL-MCNC: 3.9 MMOL/L — SIGNIFICANT CHANGE UP (ref 3.5–5.3)
POTASSIUM SERPL-SCNC: 3.9 MMOL/L — SIGNIFICANT CHANGE UP (ref 3.5–5.3)
PROT SERPL-MCNC: 5.1 G/DL — LOW (ref 6–8.3)
PROTHROM AB SERPL-ACNC: 12.5 SEC — SIGNIFICANT CHANGE UP (ref 9.8–12.7)
RBC # BLD: 3.56 M/UL — LOW (ref 4.2–5.8)
RBC # FLD: 12.3 % — SIGNIFICANT CHANGE UP (ref 10.3–14.5)
SODIUM SERPL-SCNC: 135 MMOL/L — SIGNIFICANT CHANGE UP (ref 135–145)
SPECIMEN SOURCE: SIGNIFICANT CHANGE UP
TROPONIN T SERPL-MCNC: <0.01 NG/ML — SIGNIFICANT CHANGE UP (ref 0–0.06)
WBC # BLD: 23.9 K/UL — HIGH (ref 3.8–10.5)
WBC # FLD AUTO: 23.9 K/UL — HIGH (ref 3.8–10.5)

## 2018-06-06 PROCEDURE — 87633 RESP VIRUS 12-25 TARGETS: CPT

## 2018-06-06 PROCEDURE — 81001 URINALYSIS AUTO W/SCOPE: CPT

## 2018-06-06 PROCEDURE — 86901 BLOOD TYPING SEROLOGIC RH(D): CPT

## 2018-06-06 PROCEDURE — 84484 ASSAY OF TROPONIN QUANT: CPT

## 2018-06-06 PROCEDURE — 82803 BLOOD GASES ANY COMBINATION: CPT

## 2018-06-06 PROCEDURE — 84100 ASSAY OF PHOSPHORUS: CPT

## 2018-06-06 PROCEDURE — 82553 CREATINE MB FRACTION: CPT

## 2018-06-06 PROCEDURE — 83735 ASSAY OF MAGNESIUM: CPT

## 2018-06-06 PROCEDURE — 84133 ASSAY OF URINE POTASSIUM: CPT

## 2018-06-06 PROCEDURE — 86850 RBC ANTIBODY SCREEN: CPT

## 2018-06-06 PROCEDURE — 82962 GLUCOSE BLOOD TEST: CPT

## 2018-06-06 PROCEDURE — 71045 X-RAY EXAM CHEST 1 VIEW: CPT

## 2018-06-06 PROCEDURE — 99291 CRITICAL CARE FIRST HOUR: CPT | Mod: 25

## 2018-06-06 PROCEDURE — 83930 ASSAY OF BLOOD OSMOLALITY: CPT

## 2018-06-06 PROCEDURE — 82565 ASSAY OF CREATININE: CPT

## 2018-06-06 PROCEDURE — 82550 ASSAY OF CK (CPK): CPT

## 2018-06-06 PROCEDURE — 84295 ASSAY OF SERUM SODIUM: CPT

## 2018-06-06 PROCEDURE — 85027 COMPLETE CBC AUTOMATED: CPT

## 2018-06-06 PROCEDURE — 85610 PROTHROMBIN TIME: CPT

## 2018-06-06 PROCEDURE — 86900 BLOOD TYPING SEROLOGIC ABO: CPT

## 2018-06-06 PROCEDURE — 87040 BLOOD CULTURE FOR BACTERIA: CPT

## 2018-06-06 PROCEDURE — 87186 SC STD MICRODIL/AGAR DIL: CPT

## 2018-06-06 PROCEDURE — 93010 ELECTROCARDIOGRAM REPORT: CPT

## 2018-06-06 PROCEDURE — 93005 ELECTROCARDIOGRAM TRACING: CPT

## 2018-06-06 PROCEDURE — 82570 ASSAY OF URINE CREATININE: CPT

## 2018-06-06 PROCEDURE — 80202 ASSAY OF VANCOMYCIN: CPT

## 2018-06-06 PROCEDURE — 84132 ASSAY OF SERUM POTASSIUM: CPT

## 2018-06-06 PROCEDURE — 94003 VENT MGMT INPAT SUBQ DAY: CPT

## 2018-06-06 PROCEDURE — 36556 INSERT NON-TUNNEL CV CATH: CPT

## 2018-06-06 PROCEDURE — 87449 NOS EACH ORGANISM AG IA: CPT

## 2018-06-06 PROCEDURE — 71250 CT THORAX DX C-: CPT

## 2018-06-06 PROCEDURE — 84105 ASSAY OF URINE PHOSPHORUS: CPT

## 2018-06-06 PROCEDURE — 87486 CHLMYD PNEUM DNA AMP PROBE: CPT

## 2018-06-06 PROCEDURE — 80053 COMPREHEN METABOLIC PANEL: CPT

## 2018-06-06 PROCEDURE — 83605 ASSAY OF LACTIC ACID: CPT

## 2018-06-06 PROCEDURE — 99223 1ST HOSP IP/OBS HIGH 75: CPT | Mod: GC

## 2018-06-06 PROCEDURE — 83880 ASSAY OF NATRIURETIC PEPTIDE: CPT

## 2018-06-06 PROCEDURE — 84300 ASSAY OF URINE SODIUM: CPT

## 2018-06-06 PROCEDURE — 93306 TTE W/DOPPLER COMPLETE: CPT

## 2018-06-06 PROCEDURE — 31500 INSERT EMERGENCY AIRWAY: CPT

## 2018-06-06 PROCEDURE — 82947 ASSAY GLUCOSE BLOOD QUANT: CPT

## 2018-06-06 PROCEDURE — 87086 URINE CULTURE/COLONY COUNT: CPT

## 2018-06-06 PROCEDURE — 87798 DETECT AGENT NOS DNA AMP: CPT

## 2018-06-06 PROCEDURE — 70450 CT HEAD/BRAIN W/O DYE: CPT

## 2018-06-06 PROCEDURE — 94640 AIRWAY INHALATION TREATMENT: CPT

## 2018-06-06 PROCEDURE — 83935 ASSAY OF URINE OSMOLALITY: CPT

## 2018-06-06 PROCEDURE — 95819 EEG AWAKE AND ASLEEP: CPT

## 2018-06-06 PROCEDURE — 99497 ADVNCD CARE PLAN 30 MIN: CPT | Mod: 25

## 2018-06-06 PROCEDURE — 80048 BASIC METABOLIC PNL TOTAL CA: CPT

## 2018-06-06 PROCEDURE — 36680 INSERT NEEDLE BONE CAVITY: CPT

## 2018-06-06 PROCEDURE — 94799 UNLISTED PULMONARY SVC/PX: CPT

## 2018-06-06 PROCEDURE — 95951: CPT

## 2018-06-06 PROCEDURE — 71045 X-RAY EXAM CHEST 1 VIEW: CPT | Mod: 26

## 2018-06-06 PROCEDURE — 49465 FLUORO EXAM OF G/COLON TUBE: CPT

## 2018-06-06 PROCEDURE — 87070 CULTURE OTHR SPECIMN AEROBIC: CPT

## 2018-06-06 PROCEDURE — 80164 ASSAY DIPROPYLACETIC ACD TOT: CPT

## 2018-06-06 PROCEDURE — C1751: CPT

## 2018-06-06 PROCEDURE — 82330 ASSAY OF CALCIUM: CPT

## 2018-06-06 PROCEDURE — 82435 ASSAY OF BLOOD CHLORIDE: CPT

## 2018-06-06 PROCEDURE — 99291 CRITICAL CARE FIRST HOUR: CPT

## 2018-06-06 PROCEDURE — 84146 ASSAY OF PROLACTIN: CPT

## 2018-06-06 PROCEDURE — 85730 THROMBOPLASTIN TIME PARTIAL: CPT

## 2018-06-06 PROCEDURE — 87581 M.PNEUMON DNA AMP PROBE: CPT

## 2018-06-06 PROCEDURE — 85014 HEMATOCRIT: CPT

## 2018-06-06 RX ORDER — MORPHINE SULFATE 50 MG/1
2 CAPSULE, EXTENDED RELEASE ORAL
Qty: 0 | Refills: 0 | Status: DISCONTINUED | OUTPATIENT
Start: 2018-06-06 | End: 2018-06-06

## 2018-06-06 RX ORDER — MORPHINE SULFATE 50 MG/1
2 CAPSULE, EXTENDED RELEASE ORAL
Qty: 100 | Refills: 0 | Status: DISCONTINUED | OUTPATIENT
Start: 2018-06-06 | End: 2018-06-06

## 2018-06-06 RX ORDER — MORPHINE SULFATE 50 MG/1
2 CAPSULE, EXTENDED RELEASE ORAL ONCE
Qty: 0 | Refills: 0 | Status: DISCONTINUED | OUTPATIENT
Start: 2018-06-06 | End: 2018-06-06

## 2018-06-06 RX ORDER — MORPHINE SULFATE 50 MG/1
3 CAPSULE, EXTENDED RELEASE ORAL
Qty: 100 | Refills: 0 | Status: DISCONTINUED | OUTPATIENT
Start: 2018-06-06 | End: 2018-06-06

## 2018-06-06 RX ORDER — HEPARIN SODIUM 5000 [USP'U]/ML
5000 INJECTION INTRAVENOUS; SUBCUTANEOUS EVERY 12 HOURS
Qty: 0 | Refills: 0 | Status: DISCONTINUED | OUTPATIENT
Start: 2018-06-06 | End: 2018-06-06

## 2018-06-06 RX ADMIN — MORPHINE SULFATE 2 MILLIGRAM(S): 50 CAPSULE, EXTENDED RELEASE ORAL at 20:21

## 2018-06-06 RX ADMIN — Medication 1 MILLIGRAM(S): at 20:45

## 2018-06-06 RX ADMIN — Medication 3 MILLILITER(S): at 05:05

## 2018-06-06 RX ADMIN — LIDOCAINE 1 PATCH: 4 CREAM TOPICAL at 00:07

## 2018-06-06 RX ADMIN — PIPERACILLIN AND TAZOBACTAM 25 GRAM(S): 4; .5 INJECTION, POWDER, LYOPHILIZED, FOR SOLUTION INTRAVENOUS at 09:22

## 2018-06-06 RX ADMIN — HEPARIN SODIUM 5000 UNIT(S): 5000 INJECTION INTRAVENOUS; SUBCUTANEOUS at 06:17

## 2018-06-06 RX ADMIN — Medication 1: at 01:18

## 2018-06-06 RX ADMIN — LIDOCAINE 1 PATCH: 4 CREAM TOPICAL at 11:44

## 2018-06-06 RX ADMIN — Medication 2 MILLIGRAM(S): at 21:30

## 2018-06-06 RX ADMIN — AMIODARONE HYDROCHLORIDE 16.67 MG/MIN: 400 TABLET ORAL at 04:19

## 2018-06-06 RX ADMIN — Medication 1 DROP(S): at 06:19

## 2018-06-06 RX ADMIN — PRIMIDONE 100 MILLIGRAM(S): 250 TABLET ORAL at 06:18

## 2018-06-06 RX ADMIN — MORPHINE SULFATE 2 MILLIGRAM(S): 50 CAPSULE, EXTENDED RELEASE ORAL at 20:03

## 2018-06-06 RX ADMIN — Medication 750 MILLIGRAM(S): at 06:17

## 2018-06-06 RX ADMIN — Medication 2 MILLIGRAM(S): at 20:45

## 2018-06-06 RX ADMIN — Medication 1 MILLIGRAM(S): at 20:30

## 2018-06-06 RX ADMIN — PIPERACILLIN AND TAZOBACTAM 25 GRAM(S): 4; .5 INJECTION, POWDER, LYOPHILIZED, FOR SOLUTION INTRAVENOUS at 01:19

## 2018-06-06 RX ADMIN — MIDODRINE HYDROCHLORIDE 5 MILLIGRAM(S): 2.5 TABLET ORAL at 06:18

## 2018-06-06 NOTE — AIRWAY REMOVAL NOTE  ADULT & PEDS - ARTIFICAL AIRWAY REMOVAL COMMENTS
Written order for extubation verified. The patient was identified by full name and birth date compared to the identification band. Present during the procedure was the rn

## 2018-06-06 NOTE — PROGRESS NOTE ADULT - ATTENDING COMMENTS
Agree with above. Seen and examined with residents. Critically ill on vent requiring frequent bedside visits. Weaning trials continue. Supportive care. Mom at bedside.
Mr. Oliver remains intubated, sedated.  EEG today shows severe cerebral dysfunction but no evidence of seizure activity.  will start to wean off Propofol in order to assess mental status and start weaning efforts.  Overall prognosis is poor given baseline mental funcitoning and severe scoliosis now with respiratory failure.  Bronchoscopy culture is pending.
Agree with above. Seen and examined with residents. MRCP with enterovirus related respiratory failure. Broad spectrum antibiotics, volume resuscitation, weaning trials as tolerated. Mom at bedside, informed of events.
Agree with above. Seen and examined with the residents and fellows. Had a second cardiac arrest yesterday with Vtach. CPR performed again. Not responsive today. Very poor prognosis for functional recovery. Family at bedside. Wants to terminally extubate. Will send MOLST to [facility], palliative care eval., support given to the family.
27 year old male with cerebral palsy, severe scoliosis, status post PEG, nonverbal and wheelchair bound at home now with enterorhinovirus infection, septic shock secondary to pneumonia.  History of seizure disorder.  History of PEA arrest, complicated by seizure.  Currently sedated on the vent, on 24 hour EEG monitoring.  Diffuse cerebral dysfunciton seen with no epileptiform movements at this time.  He is requiring pressor support.  He is on broad spectrum antibiotics.  Mom at [facility].  Agree with current management.  Continue broad spectrum antibiotics, pressors as needed, sedation, continue EEG monitoring.  If he remains seizure free will gradually decrease sedation in order ot assess mental status.  Cultures pending.

## 2018-06-06 NOTE — CONSULT NOTE ADULT - ASSESSMENT
27 year 27 year old with MRCP, Respiratory failure, cardiac arrest, functional quadriplegia and advance care planning. 27 year old with MRCP with Respiratory failure and s/p x 2 cardiac arrest. He is unresponsive while off of sedation. We were called for ACP/completion of MOLTS checklist

## 2018-06-06 NOTE — CONSULT NOTE ADULT - ATTENDING COMMENTS
I discussed the case with Cha Conrad from [facility] ([phone]) that indicated they were reviewing the MOLTS checklist that was sent today. She indicate that based on a prelim review the patients code status was able to be changed to DNR. However, she indicated that a finalized form needed to be completed and faxed over to [facility] in order to perform a palliative extubation.     Code status is now DNR.

## 2018-06-06 NOTE — PROVIDER CONTACT NOTE (EICU) - SITUATION
Noted change in orders and palliative care note, called RN to confirm plans to withdraw vent. Family agreed to withdraw vent support which meets triggers for [facility] referral.

## 2018-06-06 NOTE — DISCHARGE NOTE FOR THE EXPIRED PATIENT - HOSPITAL COURSE
This is a 27 yr old male PMHx of MRCP, non verbal, wheel chair bound, Asthma, Seizure disorder, scoliosis, aspiration pna in past, recent PEG replacement  brought in to ED by parents from home with progressive/worsening respiratory distress  found to have viral septic shock requiring pressor support c/b acute hypoxemic respiratory failure requiring intubation with Vtach arrest with ROSC in 6 minutes. Very poor prognosis for functional recovery. GOC discussions with the family and family decided to terminally extubate patient. At 21:34 pt weaned from the vent started on morphine drip. Pt  at 22:26 with family at bedside. Support given to the family. Dr. Kowalski made aware.

## 2018-06-06 NOTE — PROGRESS NOTE ADULT - ASSESSMENT
A/P:    27 yr old male PMHx of MRCP, non verbal, wheel chair bound, Asthma (reactive airway dz), Seizure disorder, scoliosis, aspiration pna in past, recent PEG replacement  april/2018 brought in to ED by parents from home with progressive/worsening  resp distress over two day period found to have viral septic shock requiring pressor support c/b acute hypoxemic respiratory failure requiring intubation with new Vtach arrest ( Epi x 2, sodium bicarb x 1 and shock 150J x 2, amiodarone 150mg x 1 with ROSC in 6 minutes).    #Neuro:  -neuro checks q2 hrs and prn for changes  -24 EEG no seizure activity  -continue with AED valproic acid  -ativan IVP first line for sz activity  -sz precautions, CT head no structural abnormalities    #Pulm:  -mechanical vent therapy - titrate to maintain ph 7.35-7.45; PCO2 35-45; SPO2 > 92%  -duoneb therapy q 6 hrs  -CTA chest revealed multifocal left lobar PNA, no PE  -HOB >/= 30 degree angle  -chest pt q2 hrs  -failed weaning trial 6/4/18  -Will re-attempt trial to wean off vent today / extubation     #CV:  Ventricular tachycardia arrest s/p ROSC  Epi x 2, sodium bicarb x 1 and shock 150J x 2, amiodarone 150mg x 1   Unclear etiology: hypoxia vs underlying cardiomyopathy (although previous limited echo grossly normal) vs infection   will trend CE X 3, pending CXR. EKG revealed NSR, no ST wave abnormality  c/w amiodarone gtt    -will continue to assess need for additional fluids to improve HR / BP  -hyperdynamic LV on bedside U/S on 6/4/18  -TTE (6/3) with grossly normal left ventricular systolic function    #GI/:  - Pt with peg  - Xray abdomen confirmed PEG placement  - will resume tube feeds  - strict I & O's - keep even  - protonix 40 mg IV q.d.    #ID:  - pan culture  - RVP revealed enterorhinovirus, c/w supportive measures  - discontinue zithromax as urine legionella negative  - continue zosyn 3.375 gm IV q 8 hrs  - d/c hydrocortisone as of 6/4/18    #FEN/ENDO/HEME:  - Na back at baseline   - POC glucose q 4 hr with ISS - maintain glucose 140 to 160    -Lidocaine patch on chest     -DVT prophylaxis - PAS stockings    Mikie Logan MD, PGY1 A/P:    27 yr old male PMHx of MRCP, non verbal, wheel chair bound, Asthma (reactive airway dz), Seizure disorder, scoliosis, aspiration pna in past, recent PEG replacement  april/2018 brought in to ED by parents from home with progressive/worsening  resp distress over two day period found to have viral septic shock requiring pressor support c/b acute hypoxemic respiratory failure requiring intubation with new Vtach arrest ( Epi x 2, sodium bicarb x 1 and shock 150J x 2, amiodarone 150mg x 1 with ROSC in 6 minutes). Family wishes to terminally extubate patient, palliative consult pending.    #Neuro:  -neuro checks q2 hrs and prn for changes, off sedation  -24 EEG no seizure activity  -continue with AED valproic acid  -ativan IVP first line for sz activity  -sz precautions, CT head no structural abnormalities    #Pulm:  -mechanical vent therapy - titrate to maintain ph 7.35-7.45; PCO2 35-45; SPO2 > 92%  -failed weaning trial 6/4/18  - GOC with family, decided to terminally extubate, awaiting MOLST checklist    #CV:  Ventricular tachycardia arrest s/p ROSC  Epi x 2, sodium bicarb x 1 and shock 150J x 2, amiodarone 150mg x 1   Unclear etiology: hypoxia vs underlying cardiomyopathy (although previous limited echo grossly normal) vs infection   [facility] withdraw care as patient pending terminal extubation      #GI/:  - Pt with peg  - Xray abdomen confirmed PEG placement  - Discontinue feeds for now    #ID:  - pan culture  - RVP revealed enterorhinovirus, c/w supportive measures  - discontinue zithromax as urine legionella negative  - discontinue zosyn  - d/c hydrocortisone as of 6/4/18    #FEN/ENDO/HEME:  - Na back at baseline   - No FS    -Lidocaine patch on chest     -DVT prophylaxis     Roxy eMyers PGY-1  [facility] [id number]  Pager # [phone]/ [phone]

## 2018-06-06 NOTE — PROVIDER CONTACT NOTE (EICU) - ACTION/TREATMENT ORDERED:
Called [facility] and spoke with Melinda who stated the case will be forwarded to a coordinator. He assigned case number [id number]

## 2018-06-06 NOTE — CHART NOTE - NSCHARTNOTEFT_GEN_A_CORE
Pronounced Mr. Nuno Oliver dead. Patient examined, unresponsive to verbal or tactile stimuli, no spontaneous respirations, heart sounds not audible, pulses absent, pupils fixed and dilated. Pt pronounced at 22:26. Mom, Dad and other family members at bedside. No autopsy as per family. Dr. Kowalski notified.

## 2018-06-06 NOTE — CONSULT NOTE ADULT - PROBLEM SELECTOR RECOMMENDATION 2
Patient at his baseline. Continue supportive care. Secondary to possible aspiration PNA and multiple cardiac arrest. Patient is now vent dependent and family would like patient to be palliatively extubated as they don't think that life sustaining measures are providing him with a good quality of life. Awaiting permission from [employer] to withdraw care.  If palliative exutabation is approved, will recommend:   Ensure  that  vent  settings  are  as  follows:   1) CPAP  5,  PS  8   2)Reduce  FIO2  to  21%  3)Titrate  drugs  for  pain/sedation  meds  as  needed:    a. If  no  apparent  distress  and  respiratory  rate  <30/min,  pre-treat  with  low-dose  of  opioid  and  low-dose  of  sedative  hypnotic:  i. Morphine  2-4  mg  if  opioid-naïve  or  usual  rescue  dose  if  receiving  opioid  treatment;  equivalent  dose  of  hydromorphone  (0.3-0.6  mg  if  opioid-naïve)  or  fentanyl  (15-30  mcg  if  opioid-naïve)  can  be  used    ii.Lorazepam  or  midazolam  1-2  mg  IV     b.If  apparent  distress  or  respiratory  rate  >30/min,  start  with  the  above  doses  and  titrate  opioid  and  sedative-hypnotic  until  there  is  no  distress  and  respiratory  rate  <30/min  i.Repeat  initial  opioid  dose  or  initial  dose  plus  50%  every  10-15  minutes    ii.Repeat  initial  sedative-hypnotic  dose  every  10-15  min  iii. If  the  patient  has  been  receiving  opioids,  continue  regimen  or  consider  increased  doses  depending  on  the  number  of  boluses  required;  increase  as  repetitive  IV  administration  or  as  infusion  (infusion  dose  can  be  calculated  as  total  amount  of  boluses  divided  by  6  as  hourly  rate).    iv.If  >3  boluses  of  lorazepam  or  midazolam  required  for  sedation,  consider  infusion,  starting  at  hourly  rate  equal  to  10-20%  of  the  total  amount  bolused  6)Extubate  to  room  air  unless  02  needed  for  comfort  or  patient/family  request    If extubation is approved his parents would like to wait until family members come before doing so.

## 2018-06-06 NOTE — CONSULT NOTE ADULT - SUBJECTIVE AND OBJECTIVE BOX
pt is a 28 y/o males seen for left foot lesion/wound   pedal pulses palpable TG WNL CFT 3 sec x 10  epicritic sensation absent due to pt intubated  no lesions or wounds noted on feet b/l no erythema mild edema   feet noted to be shorten and almost clubbed  nails elongated and thickened x 10 left hallux nail border incurvated   contracted digits and contract LE at hips  continue to offload feet from bed with pillows and blankets as well as alleyvn  will return to debride nails later today or tomorrow   thank you for consult

## 2018-06-06 NOTE — CONSULT NOTE ADULT - PROBLEM SELECTOR RECOMMENDATION 6
Discussion had with the parents of the patient. They are understanding of the current situation and would not want their son to suffer any more. They would like to withdraw care. It was explained that because he was part of a day program, he is protected by [employer], and a [facility]OL[facility] checklist has to be filled out. The family would want him to be a DNR/DNI, and no suffer any more. They understand that they need to wait for permission from [employer], emotional support provided. 30 minutes was spent talking to family about advance care planning. MOLST checklist filled out and faxed to [employer].

## 2018-06-06 NOTE — CONSULT NOTE ADULT - PROBLEM SELECTOR RECOMMENDATION 4
Secondary to possible aspiration PNA and multiple cardiac arrest. Patient is now vent dependent and family would like patient to be electively extubated as they don't want him to suffer any more. Awaiting permission from [employer] to withdraw care.    Palliative available to assist in extubation if needed. Discussion had with the parents of the patient. They are understanding of the current situation and would not want their son to suffer any more. They would like to withdraw care. It was explained that because he was part of a day program, he is protected by [employer], and a MOLST checklist has to be filled out. The family would want him to be a DNR/DNI, and no suffer any more. They understand that they need to wait for permission from [employer], emotional support provided. 45 minutes were spent by Dr Rider talking to family, [employer], and completing a MOLTS checklist in order to define ACP. MOLST checklist was faxed to [employer].

## 2018-06-06 NOTE — PROGRESS NOTE ADULT - SUBJECTIVE AND OBJECTIVE BOX
INTERVAL HPI/OVERNIGHT EVENTS: In the evening, Pt in VT arrest with no pulse, chest compressions started immediately. Pt received Epi x 2, sodium bicarb x 1 and shock 150J x 2, amiodarone 150mg x 1 with ROSC in 6 minutes. Pt's mom at bedside, updated about the event. Dr. Whiting at bedside. Pt started on amiodarone drip post code. Will monitor closely. No further episodes of vtach      SUBJECTIVE: Patient seen and examined at bedside. Today AM EKG appears in normal sinus rhythm, cardiac enzymes pending, will repeat echo. Off phenylephrine, off sedation.     ROS unable to assess due to mental status    OBJECTIVE:    VITAL SIGNS:  ICU Vital Signs Last 24 Hrs  T(C): 36.6 (06 Jun 2018 07:00), Max: 37 (05 Jun 2018 14:00)  T(F): 97.9 (06 Jun 2018 07:00), Max: 98.6 (05 Jun 2018 14:00)  HR: 80 (06 Jun 2018 08:12) (74 - 145)  BP: 89/54 (06 Jun 2018 08:00) (79/52 - 149/72)  BP(mean): 67 (06 Jun 2018 08:00) (56 - 112)  ABP: --  ABP(mean): --  RR: 17 (06 Jun 2018 08:00) (16 - 56)  SpO2: 100% (06 Jun 2018 08:12) (94% - 100%)    Mode: AC/ CMV (Assist Control/ Continuous Mandatory Ventilation), RR (machine): 16, TV (machine): 350, FiO2: 60, PEEP: 5, ITime: 1, MAP: 10, PIP: 37    06-05 @ 07:01  -  06-06 @ 07:00  --------------------------------------------------------  IN: 1322.4 mL / OUT: 745 mL / NET: 577.4 mL    06-06 @ 07:01  -  06-06 @ 09:38  --------------------------------------------------------  IN: 50.1 mL / OUT: 65 mL / NET: -14.9 mL      CAPILLARY BLOOD GLUCOSE      POCT Blood Glucose.: 108 mg/dL (06 Jun 2018 06:16)      PHYSICAL EXAM:    General: NAD, intubated and sedated  HEENT: NC/AT; PERRL, clear conjunctiva  Neck: supple  Respiratory: CTA b/l  Cardiovascular: +S1/S2; RRR  Abdomen: soft, NT/ND; +BS x4  Extremities: WWP, 2+ peripheral pulses b/l; no LE edema  Skin: normal color and turgor; no rash  Neurological: Sedated    MEDICATIONS:  MEDICATIONS  (STANDING):  ALBUTerol/ipratropium for Nebulization 3 milliLiter(s) Nebulizer every 6 hours  amiodarone Infusion 1 mG/Min (33.333 mL/Hr) IV Continuous <Continuous>  amiodarone Infusion 0.5 mG/Min (16.667 mL/Hr) IV Continuous <Continuous>  artificial  tears Solution 1 Drop(s) Both EYES three times a day  heparin  Injectable 5000 Unit(s) SubCutaneous every 12 hours  insulin lispro (HumaLOG) corrective regimen sliding scale   SubCutaneous every 6 hours  lidocaine   Patch 1 Patch Transdermal daily  midodrine 5 milliGRAM(s) Oral every 8 hours  piperacillin/tazobactam IVPB. 3.375 Gram(s) IV Intermittent every 8 hours  polyethylene glycol 3350 17 Gram(s) Oral two times a day  primidone 100 milliGRAM(s) Oral every 12 hours  senna Syrup 10 milliLiter(s) Oral at bedtime  tiotropium 18 MICROgram(s) Capsule 1 Capsule(s) Inhalation daily  valproic  acid Syrup 750 milliGRAM(s) Oral every 8 hours    MEDICATIONS  (PRN):      ALLERGIES:  Allergies    No Known Allergies    Intolerances        LABS:                        11.7   23.9  )-----------( 205      ( 06 Jun 2018 00:34 )             33.0     06-06    135  |  95<L>  |  9   ----------------------------<  163<H>  3.9   |  31  |  <0.30<L>    Ca    8.3<L>      06 Jun 2018 00:34  Phos  3.8     06-06  Mg     1.9     06-06    TPro  5.1<L>  /  Alb  2.4<L>  /  TBili  0.2  /  DBili  x   /  AST  55<H>  /  ALT  18  /  AlkPhos  64  06-06    PT/INR - ( 06 Jun 2018 00:34 )   PT: 12.5 sec;   INR: 1.15 ratio         PTT - ( 06 Jun 2018 00:34 )  PTT:30.2 sec      RADIOLOGY & ADDITIONAL TESTS: Reviewed. INTERVAL HPI/OVERNIGHT EVENTS: In the evening, Pt in VT arrest with no pulse, chest compressions started immediately. Pt received Epi x 2, sodium bicarb x 1 and shock 150J x 2, amiodarone 150mg x 1 with ROSC in 6 minutes. Pt's mom at bedside, updated about the event. Dr. Whiting at bedside. Pt started on amiodarone drip post code. Will monitor closely. No further episodes of vtach      SUBJECTIVE: Patient seen and examined at bedside. Today AM EKG appears in normal sinus rhythm, cardiac enzymes negative.Off phenylephrine, off sedation.     ROS unable to assess due to mental status    OBJECTIVE:    VITAL SIGNS:  ICU Vital Signs Last 24 Hrs  T(C): 36.6 (06 Jun 2018 07:00), Max: 37 (05 Jun 2018 14:00)  T(F): 97.9 (06 Jun 2018 07:00), Max: 98.6 (05 Jun 2018 14:00)  HR: 80 (06 Jun 2018 08:12) (74 - 145)  BP: 89/54 (06 Jun 2018 08:00) (79/52 - 149/72)  BP(mean): 67 (06 Jun 2018 08:00) (56 - 112)  ABP: --  ABP(mean): --  RR: 17 (06 Jun 2018 08:00) (16 - 56)  SpO2: 100% (06 Jun 2018 08:12) (94% - 100%)    Mode: AC/ CMV (Assist Control/ Continuous Mandatory Ventilation), RR (machine): 16, TV (machine): 350, FiO2: 60, PEEP: 5, ITime: 1, MAP: 10, PIP: 37    06-05 @ 07:01 - 06-06 @ 07:00  --------------------------------------------------------  IN: 1322.4 mL / OUT: 745 mL / NET: 577.4 mL    06-06 @ 07:01 - 06-06 @ 09:38  --------------------------------------------------------  IN: 50.1 mL / OUT: 65 mL / NET: -14.9 mL      CAPILLARY BLOOD GLUCOSE      POCT Blood Glucose.: 108 mg/dL (06 Jun 2018 06:16)      PHYSICAL EXAM:    General: NAD, intubated and sedated  HEENT: NC/AT; edematous conjunctiva b/l  Neck: supple  Respiratory: CTA b/l  Cardiovascular: +S1/S2; RRR  Abdomen: soft, NT/ND; +BS x4  Extremities: WWP, 2+ peripheral pulses b/l; no LE edema, contracted LE b/l  Skin: normal color and turgor; no rash  Neurological: obtunded    MEDICATIONS:  MEDICATIONS  (STANDING):  ALBUTerol/ipratropium for Nebulization 3 milliLiter(s) Nebulizer every 6 hours  amiodarone Infusion 1 mG/Min (33.333 mL/Hr) IV Continuous <Continuous>  amiodarone Infusion 0.5 mG/Min (16.667 mL/Hr) IV Continuous <Continuous>  artificial  tears Solution 1 Drop(s) Both EYES three times a day  heparin  Injectable 5000 Unit(s) SubCutaneous every 12 hours  insulin lispro (HumaLOG) corrective regimen sliding scale   SubCutaneous every 6 hours  lidocaine   Patch 1 Patch Transdermal daily  midodrine 5 milliGRAM(s) Oral every 8 hours  piperacillin/tazobactam IVPB. 3.375 Gram(s) IV Intermittent every 8 hours  polyethylene glycol 3350 17 Gram(s) Oral two times a day  primidone 100 milliGRAM(s) Oral every 12 hours  senna Syrup 10 milliLiter(s) Oral at bedtime  tiotropium 18 MICROgram(s) Capsule 1 Capsule(s) Inhalation daily  valproic  acid Syrup 750 milliGRAM(s) Oral every 8 hours    MEDICATIONS  (PRN):      ALLERGIES:  Allergies    No Known Allergies    Intolerances        LABS:                        11.7   23.9  )-----------( 205      ( 06 Jun 2018 00:34 )             33.0     06-06    135  |  95<L>  |  9   ----------------------------<  163<H>  3.9   |  31  |  <0.30<L>    Ca    8.3<L>      06 Jun 2018 00:34  Phos  3.8     06-06  Mg     1.9     06-06    TPro  5.1<L>  /  Alb  2.4<L>  /  TBili  0.2  /  DBili  x   /  AST  55<H>  /  ALT  18  /  AlkPhos  64  06-06    PT/INR - ( 06 Jun 2018 00:34 )   PT: 12.5 sec;   INR: 1.15 ratio         PTT - ( 06 Jun 2018 00:34 )  PTT:30.2 sec      RADIOLOGY & ADDITIONAL TESTS: Reviewed.

## 2018-06-06 NOTE — CONSULT NOTE ADULT - SUBJECTIVE AND OBJECTIVE BOX
HPI:  27 year old man with MRCP, non verbal, wheel chair bound, Asthma (reactive airway dz), Seizure disorder, scoliosis, aspiration pna in past, recent PEG replacement  april/2018 brought in to ED by parents from home with progressive/worsening  resp distress over two day period. Parents endorse pt developed URI viral symptoms two days prior to presentation (5/30) with  increased of WOB, home SPO2 check demonstrated sats of 98 -99%. sx accompanied by fever of 100.8 treated with tylenol and motrin, albuterol and budenoside nebulizer tx's, frequent chest PT and placed on amoxicillin (pt pediatrician). Pt with progressive resp distress over ensuing days with increased mucus production and  ineffective cough with increasing oxygen needs to 4 to 5 liters N/C (parents endorse home O2 with rare use). In E.D. at triage pt found to be unresponsive,   pulseless. CPR/ACLS protocol initiated, intubated. Initial cardiac rhythm of asystole to PEA, received total 2 epi with ROSC within 7 minutes (1213pm to 1220pm). While admitted to the MICU patient had VT arrest with no pulse and had chest compressions started and was placed on amiodarone drip. Family understands the overall poor prognosis and is ready to withdraw care of their son.       PERTINENT PMH REVIEWED:  [x ] YES [ ] NO           SOCIAL HISTORY:  Significant other/partner:  [ ] YES  [x ] NO            Children:  [ ] YES  [ x] NO                   Oriental orthodox/Spirituality:  Substance hx:  [ ] YES   [ x] NO           Tobacco hx:  [ ] YES  [x ] NO             Alcohol hx: [ ] YES  [x ] NO        Home Opioid hx:  [ ] YES  [x ] NO   Living Situation: [ ] Home  [ ] Long term care  [ ] Rehab    FAMILY HISTORY:  No pertinent family history in first degree relatives    [ ] Family history non contributory     BASELINE ADLs (prior to admission):  Independent [ ] moderately [ ] fully   Dependent   [ ] moderately [x ] fully    Code Status:     DNR                 MOLST  [ ] YES [x ] NO    Living Will  [ ] YES [x ] NO    Health Care Proxy [ ] YES  [x ] NO      [x ] Surrogate  [ ] HCP  [ ] Guardian:       Damaris and Nuno Benito      (parents)                                                   Phone#: [phone]      Allergies    No Known Allergies    Intolerances        MEDICATIONS  (STANDING):  lidocaine   Patch 1 Patch Transdermal daily    MEDICATIONS  (PRN):      PRESENT SYMPTOMS:  Source: [ ] Patient   [ ] Family   [ ] Team     Pain: [ ] YES [ ] NO  Onset:                    Location:                          Duration:                 Character:            Aggravating factors:                        Relieving factors:    Radiation:              Timing:                             Severity:      Dyspnea: [ ] YES [ ] NO - Mild [ ]  Moderate [ ]  Severe [ ]    Anxiety: [ ] YES [ ] NO  Fatigue: [ ] YES [ ] NO   Nausea: [ ] YES [ ] NO  Loss of appetite: [ ] YES [ ] NO   Constipation: [ ] YES [ ] NO     Other Symptoms:  [ ] All other review of systems negative   [x] Unable to obtain due to poor mentation     Does patient meet criteria for Severe Protein Calorie Malnutrition?  Yes [ ]  No [ ]  PPSV 30% or below [ ]  Anasarca [ ]  Albumin < 2 [ ] Catabolic State [ ] Poor nutritional intake [ ] Significant weight loss [ ]      Palliative Performance Status Version 2:       10  %    Vital Signs Last 24 Hrs  T(C): 36.7 (06 Jun 2018 11:00), Max: 37 (05 Jun 2018 14:00)  T(F): 98 (06 Jun 2018 11:00), Max: 98.6 (05 Jun 2018 14:00)  HR: 82 (06 Jun 2018 12:00) (74 - 145)  BP: 91/61 (06 Jun 2018 12:00) (79/52 - 149/72)  BP(mean): 71 (06 Jun 2018 12:00) (56 - 112)  RR: 16 (06 Jun 2018 12:00) (16 - 56)  SpO2: 100% (06 Jun 2018 12:00) (94% - 100%)    Physical Exam:    General: [ ] Alert,  A&O x     [ ] lethargic   [ ] Agitated   [ ] Cachexia [x] sedated  HEENT: [ ] Normal   [ ] Dry mouth   [ x] ET Tube    [ ] Trach   Lungs: [x ] Clear [ ] Rhonchi  [ ] Crackles [ ] Wheezing [ ] Tachypnea  [ ] Audible excessive secretions    Cardiovascular:  [ x] Regular rate and rhythm  [ ] Irregular [ ] Tachycardia   [ ] Bradycardia   Abdomen: [ ] Soft  [ ] Distended  [ ] +BS  [ ] Non tender [ ] Tender  [ x]PEG   [ ] NGT   Last BM:     Genitourinary: [ ] Normal [ ] Incontinent   [ ] Oliguria/Anuria   [x ] Rueda  Musculoskeletal:  [ ] Normal   [ ] Generalized weakness  [ x] Bedbound  [ ] Edema   Neurological: [ ] No focal deficits  [ x] Cognitive impairment     Skin: [x ] Normal   [ ] Pressure ulcers     LABS:                        11.7   23.9  )-----------( 205      ( 06 Jun 2018 00:34 )             33.0     06-06    135  |  95<L>  |  9   ----------------------------<  163<H>  3.9   |  31  |  <0.30<L>    Ca    8.3<L>      06 Jun 2018 00:34  Phos  3.8     06-06  Mg     1.9     06-06    TPro  5.1<L>  /  Alb  2.4<L>  /  TBili  0.2  /  DBili  x   /  AST  55<H>  /  ALT  18  /  AlkPhos  64  06-06    PT/INR - ( 06 Jun 2018 00:34 )   PT: 12.5 sec;   INR: 1.15 ratio         PTT - ( 06 Jun 2018 00:34 )  PTT:30.2 sec    I&O's Summary    05 Jun 2018 07:01  -  06 Jun 2018 07:00  --------------------------------------------------------  IN: 1322.4 mL / OUT: 745 mL / NET: 577.4 mL    06 Jun 2018 07:01  -  06 Jun 2018 12:23  --------------------------------------------------------  IN: 141.8 mL / OUT: 160 mL / NET: -18.2 mL        RADIOLOGY & ADDITIONAL STUDIES:  < from: CT Chest No Cont (06.01.18 @ 15:38) >  EXAM:  CT CHEST                            PROCEDURE DATE:  06/01/2018  IMPRESSION:    Multifocal pneumonia in the left upper and lower lobes.    < end of copied text >      < from: Xray Chest 1 View- PORTABLE-Urgent (06.06.18 @ 09:13) >    EXAM:  XR CHEST PORTABLE URGENT 1V                          PROCEDURE DATE:  06/06/2018      Impression:  The heart is slightly enlarged. The lungs are clear. Severe right   thoracic scoliosis. Endotracheal tube is in good position. A central line   seen on the right and the tip is superior vena cava. No pneumothorax.    < end of copied text >      Referrals:  Hospice [ ]   Chaplaincy [ x]    Child Life [ ]   Social Work [ ]   Case Management [ ]   Holistic Therapy [ ] HPI:  27 year old man with MRCP, non verbal, wheel chair bound, Asthma (reactive airway dz), Seizure disorder, scoliosis, aspiration pna in past, recent PEG replacement  april/2018 brought in to ED by parents from home with progressive/worsening  resp distress over two day period. Parents endorse pt developed URI viral symptoms two days prior to presentation (5/30) with  increased of WOB, home SPO2 check demonstrated sats of 98 -99%. sx accompanied by fever of 100.8 treated with tylenol and motrin, albuterol and budenoside nebulizer tx's, frequent chest PT and placed on amoxicillin (pt pediatrician). Pt with progressive resp distress over ensuing days with increased mucus production and  ineffective cough with increasing oxygen needs to 4 to 5 liters N/C (parents endorse home O2 with rare use). In E.D. at triage pt found to be unresponsive,   pulseless. CPR/ACLS protocol initiated, intubated. Initial cardiac rhythm of asystole to PEA, received total 2 epi with ROSC within 7 minutes (1213pm to 1220pm). While admitted to the MICU patient had VT arrest with no pulse and had chest compressions started and was placed on amiodarone drip. Family understands the overall poor prognosis and is ready to withdraw care of their son.       PERTINENT PMH REVIEWED:  [x ] YES [ ] NO           SOCIAL HISTORY:  Significant other/partner:  [ ] YES  [x ] NO            Children:  [ ] YES  [ x] NO                   Moravian/Spirituality: Scientology   Substance hx:  [ ] YES   [ x] NO           Tobacco hx:  [ ] YES  [x ] NO             Alcohol hx: [ ] YES  [x ] NO        Home Opioid hx:  [ ] YES  [x ] NO   Living Situation: [ ] Home  [ ] Long term care  [ ] Rehab    FAMILY HISTORY:  No pertinent family history in first degree relatives    [ ] Family history non contributory     BASELINE ADLs (prior to admission):  Independent [ ] moderately [ ] fully   Dependent   [ ] moderately [x ] fully    Code Status:     Full code              MOLST  [ ] YES [x ] NO    Living Will  [ ] YES [x ] NO    Health Care Proxy [ ] YES  [x ] NO      [x ] Surrogate  [ ] HCP  [ ] Guardian:       Damaris and Nuno Benito      (parents)                                                   Phone#: [phone]      Allergies    No Known Allergies    Intolerances        MEDICATIONS  (STANDING):  lidocaine   Patch 1 Patch Transdermal daily    MEDICATIONS  (PRN):      PRESENT SYMPTOMS:  Source: [ ] Patient   [ ] Family   [ ] Team     Pain: [ ] YES [ ] NO  Onset:                    Location:                          Duration:                 Character:            Aggravating factors:                        Relieving factors:    Radiation:              Timing:                             Severity:      Dyspnea: [ ] YES [ ] NO - Mild [ ]  Moderate [ ]  Severe [ ]    Anxiety: [ ] YES [ ] NO  Fatigue: [ ] YES [ ] NO   Nausea: [ ] YES [ ] NO  Loss of appetite: [ ] YES [ ] NO   Constipation: [ ] YES [ ] NO     Other Symptoms:  [ ] All other review of systems negative   [x] Unable to obtain due to poor mentation     Does patient meet criteria for Severe Protein Calorie Malnutrition?  Yes [ ]  No [ ]  PPSV 30% or below [ ]  Anasarca [ ]  Albumin < 2 [ ] Catabolic State [ ] Poor nutritional intake [ ] Significant weight loss [ ]      Palliative Performance Status Version 2:       10  %    Vital Signs Last 24 Hrs  T(C): 36.7 (06 Jun 2018 11:00), Max: 37 (05 Jun 2018 14:00)  T(F): 98 (06 Jun 2018 11:00), Max: 98.6 (05 Jun 2018 14:00)  HR: 82 (06 Jun 2018 12:00) (74 - 145)  BP: 91/61 (06 Jun 2018 12:00) (79/52 - 149/72)  BP(mean): 71 (06 Jun 2018 12:00) (56 - 112)  RR: 16 (06 Jun 2018 12:00) (16 - 56)  SpO2: 100% (06 Jun 2018 12:00) (94% - 100%)    Physical Exam:    General: [ ] Alert,  A&O x     [ ] lethargic   [ ] Agitated   [ ] Cachexia [x] sedated  HEENT: [ ] Normal   [ ] Dry mouth   [ x] ET Tube    [ ] Trach   Lungs: [x ] Clear [ ] Rhonchi  [ ] Crackles [ ] Wheezing [ ] Tachypnea  [ ] Audible excessive secretions    Cardiovascular:  [ x] Regular rate and rhythm  [ ] Irregular [ ] Tachycardia   [ ] Bradycardia   Abdomen: [ ] Soft  [ ] Distended  [ ] +BS  [ ] Non tender [ ] Tender  [ x]PEG   [ ] NGT   Last BM:     Genitourinary: [ ] Normal [ ] Incontinent   [ ] Oliguria/Anuria   [x ] Rueda  Musculoskeletal:  [ ] Normal   [ ] Generalized weakness  [ x] Bedbound  [ ] Edema   Neurological: [ ] No focal deficits  [ x] Cognitive impairment     Skin: [x ] Normal   [ ] Pressure ulcers     LABS:                        11.7   23.9  )-----------( 205      ( 06 Jun 2018 00:34 )             33.0     06-06    135  |  95<L>  |  9   ----------------------------<  163<H>  3.9   |  31  |  <0.30<L>    Ca    8.3<L>      06 Jun 2018 00:34  Phos  3.8     06-06  Mg     1.9     06-06    TPro  5.1<L>  /  Alb  2.4<L>  /  TBili  0.2  /  DBili  x   /  AST  55<H>  /  ALT  18  /  AlkPhos  64  06-06    PT/INR - ( 06 Jun 2018 00:34 )   PT: 12.5 sec;   INR: 1.15 ratio         PTT - ( 06 Jun 2018 00:34 )  PTT:30.2 sec    I&O's Summary    05 Jun 2018 07:01  -  06 Jun 2018 07:00  --------------------------------------------------------  IN: 1322.4 mL / OUT: 745 mL / NET: 577.4 mL    06 Jun 2018 07:01  -  06 Jun 2018 12:23  --------------------------------------------------------  IN: 141.8 mL / OUT: 160 mL / NET: -18.2 mL        RADIOLOGY & ADDITIONAL STUDIES:  < from: CT Chest No Cont (06.01.18 @ 15:38) >  EXAM:  CT CHEST                            PROCEDURE DATE:  06/01/2018  IMPRESSION:    Multifocal pneumonia in the left upper and lower lobes.    < end of copied text >      < from: Xray Chest 1 View- PORTABLE-Urgent (06.06.18 @ 09:13) >    EXAM:  XR CHEST PORTABLE URGENT 1V                          PROCEDURE DATE:  06/06/2018      Impression:  The heart is slightly enlarged. The lungs are clear. Severe right   thoracic scoliosis. Endotracheal tube is in good position. A central line   seen on the right and the tip is superior vena cava. No pneumothorax.    < end of copied text >      Referrals:  Hospice [ ]   Chaplaincy [ x]    Child Life [ ]   Social Work [ ]   Case Management [ ]   Holistic Therapy [ ] HPI:  27 year old man with MRCP, non verbal, wheel chair bound, Asthma (reactive airway dz), Seizure disorder, scoliosis, aspiration pna in past, recent PEG replacement  april/2018 brought in to ED by parents from home with progressive/worsening  resp distress over two day period. Parents endorse pt developed URI viral symptoms two days prior to presentation (5/30) with  increased of WOB, home SPO2 check demonstrated sats of 98 -99%. sx accompanied by fever of 100.8 treated with tylenol and motrin, albuterol and budenoside nebulizer tx's, frequent chest PT and placed on amoxicillin (pt pediatrician). Pt with progressive resp distress over ensuing days with increased mucus production and  ineffective cough with increasing oxygen needs to 4 to 5 liters N/C (parents endorse home O2 with rare use). In E.D. at triage pt found to be unresponsive, pulseless. CPR/ACLS protocol initiated, intubated. Initial cardiac rhythm of asystole to PEA, received total 2 epi with ROSC within 7 minutes (1213pm to 1220pm). While admitted to the MICU patient had VT arrest with no pulse and had chest compressions started and was placed on amiodarone drip. His parents, that are also his legal guardians, understands the overall poor prognosis and they are looking for the patient to be DNR and also for having a palliative extubation. We were called for ACP and completion of a MOLST check list.        PERTINENT PMH REVIEWED:  [x ] YES [ ] NO           SOCIAL HISTORY:  Significant other/partner:  [ ] YES  [x ] NO            Children:  [ ] YES  [ x] NO                   Restorationism/Spirituality: Jehovah's witness   Substance hx:  [ ] YES   [ x] NO           Tobacco hx:  [ ] YES  [x ] NO             Alcohol hx: [ ] YES  [x ] NO        Home Opioid hx:  [ ] YES  [x ] NO   Living Situation: [ ] Home  [ ] Long term care  [ ] Rehab    FAMILY HISTORY:  No pertinent family history in first degree relatives    [ ] Family history non contributory     BASELINE ADLs (prior to admission):  Independent [ ] moderately [ ] fully   Dependent   [ ] moderately [x ] fully    Code Status:     Full code              MOLST  [ ] YES [x ] NO    Living Will  [ ] YES [x ] NO    Health Care Proxy [ ] YES  [x ] NO      [ ] Surrogate  [ ] HCP  [x ] Guardian:       Krystle Oliver      (parents)                                                   Phone#: [phone]      Allergies    No Known Allergies    Intolerances        MEDICATIONS  (STANDING):  lidocaine   Patch 1 Patch Transdermal daily    MEDICATIONS  (PRN):      PRESENT SYMPTOMS:  Source: [ ] Patient   [x ] Family   [ x] Team     Pain: [ ] YES [x ] NO  Onset:                    Location:                          Duration:                 Character:            Aggravating factors:                        Relieving factors:    Radiation:              Timing:                             Severity:      Dyspnea: [ ] YES [ ] NO - Mild [ ]  Moderate [ ]  Severe [ ]    Anxiety: [ ] YES [ ] NO  Fatigue: [ ] YES [ ] NO   Nausea: [ ] YES [ ] NO  Loss of appetite: [ ] YES [ ] NO   Constipation: [ ] YES [ ] NO     Other Symptoms:  [ ] All other review of systems negative   [x] Unable to obtain due to poor mentation     Does patient meet criteria for Severe Protein Calorie Malnutrition?  Yes [ ]  No [ ]  PPSV 30% or below [ ]  Anasarca [ ]  Albumin < 2 [ ] Catabolic State [ ] Poor nutritional intake [ ] Significant weight loss [ ]      Palliative Performance Status Version 2:       10  %    Vital Signs Last 24 Hrs  T(C): 36.7 (06 Jun 2018 11:00), Max: 37 (05 Jun 2018 14:00)  T(F): 98 (06 Jun 2018 11:00), Max: 98.6 (05 Jun 2018 14:00)  HR: 82 (06 Jun 2018 12:00) (74 - 145)  BP: 91/61 (06 Jun 2018 12:00) (79/52 - 149/72)  BP(mean): 71 (06 Jun 2018 12:00) (56 - 112)  RR: 16 (06 Jun 2018 12:00) (16 - 56)  SpO2: 100% (06 Jun 2018 12:00) (94% - 100%)    Physical Exam:    General: [ ] Alert,  A&O x     [ ] lethargic   [ ] Agitated   [ ] Cachexia [x] sedated  HEENT: [ ] Normal   [ ] Dry mouth   [ x] ET Tube    [ ] Trach   Lungs: [x ] Clear [ ] Rhonchi  [ ] Crackles [ ] Wheezing [ ] Tachypnea  [ ] Audible excessive secretions    Cardiovascular:  [ x] Regular rate and rhythm  [ ] Irregular [ ] Tachycardia   [ ] Bradycardia   Abdomen: [ ] Soft  [ ] Distended  [ ] +BS  [ ] Non tender [ ] Tender  [ x]PEG   [ ] NGT   Last BM:     Genitourinary: [ ] Normal [ ] Incontinent   [ ] Oliguria/Anuria   [x ] Rueda  Musculoskeletal:  [ ] Normal   [ ] Generalized weakness  [ x] Bedbound  [ ] Edema   Neurological: [ ] No focal deficits  [ x] Cognitive impairment     Skin: [x ] Normal   [ ] Pressure ulcers     LABS:                        11.7   23.9  )-----------( 205      ( 06 Jun 2018 00:34 )             33.0     06-06    135  |  95<L>  |  9   ----------------------------<  163<H>  3.9   |  31  |  <0.30<L>    Ca    8.3<L>      06 Jun 2018 00:34  Phos  3.8     06-06  Mg     1.9     06-06    TPro  5.1<L>  /  Alb  2.4<L>  /  TBili  0.2  /  DBili  x   /  AST  55<H>  /  ALT  18  /  AlkPhos  64  06-06    PT/INR - ( 06 Jun 2018 00:34 )   PT: 12.5 sec;   INR: 1.15 ratio         PTT - ( 06 Jun 2018 00:34 )  PTT:30.2 sec    I&O's Summary    05 Jun 2018 07:01  -  06 Jun 2018 07:00  --------------------------------------------------------  IN: 1322.4 mL / OUT: 745 mL / NET: 577.4 mL    06 Jun 2018 07:01  -  06 Jun 2018 12:23  --------------------------------------------------------  IN: 141.8 mL / OUT: 160 mL / NET: -18.2 mL        RADIOLOGY & ADDITIONAL STUDIES:  < from: CT Chest No Cont (06.01.18 @ 15:38) >  EXAM:  CT CHEST                            PROCEDURE DATE:  06/01/2018  IMPRESSION:    Multifocal pneumonia in the left upper and lower lobes.    < end of copied text >      < from: Xray Chest 1 View- PORTABLE-Urgent (06.06.18 @ 09:13) >    EXAM:  XR CHEST PORTABLE URGENT 1V                          PROCEDURE DATE:  06/06/2018      Impression:  The heart is slightly enlarged. The lungs are clear. Severe right   thoracic scoliosis. Endotracheal tube is in good position. A central line   seen on the right and the tip is superior vena cava. No pneumothorax.    < end of copied text >      Referrals:  Hospice [ ]   Chaplaincy [ x]    Child Life [ ]   Social Work [ ]   Case Management [ ]   Holistic Therapy [ ]

## 2018-06-06 NOTE — CHART NOTE - NSCHARTNOTEFT_GEN_A_CORE
Contact note.    Chart reviewed, events noted. Pt is S/P [facility] 6/5. EN D/C. Plan for elective wean. No nutrition interventions at this time. Contact note.    Chart reviewed, events noted. Pt is S/P Vtach arrest 6/5. EN D/C. Plan for elective wean. No nutrition interventions at this time.

## 2018-06-06 NOTE — CONSULT NOTE ADULT - PROBLEM SELECTOR RECOMMENDATION 9
Patient went to day activities and is protected by [facility], even though he resides at home. In regards to withdrawal of care, need permission from [facility]. Patient with multiple cardiac arrests, now with overall poor prognosis. Parents  understand his overall poor prognosis and would like withdrawal of life sustaining measures.

## 2018-06-07 LAB
CULTURE RESULTS: SIGNIFICANT CHANGE UP
SPECIMEN SOURCE: SIGNIFICANT CHANGE UP

## 2018-06-07 RX ADMIN — MORPHINE SULFATE 2 MG/HR: 50 CAPSULE, EXTENDED RELEASE ORAL at 00:00

## 2019-12-18 NOTE — ED ADULT NURSE NOTE - NS ED NOTE  TALK SOMEONE YN
No
Implemented All Fall Risk Interventions:  [place] to call system. Call bell, personal items and telephone within reach. Instruct patient to call for assistance. Room bathroom lighting operational. Non-slip footwear when patient is off stretcher. Physically safe environment: no spills, clutter or unnecessary equipment. Stretcher in lowest position, wheels locked, appropriate side rails in place. Provide visual cue, wrist band, yellow gown, etc. Monitor gait and stability. Monitor for mental status changes and reorient to person, place, and time. Review medications for side effects contributing to fall risk. Reinforce activity limits and safety measures with patient and family.

## 2020-08-14 NOTE — ED ADULT NURSE REASSESSMENT NOTE - NS ED NURSE REASSESS COMMENT FT1
Patient discharged by [facility] and this RN. Patient ambulated with cane and knee immobilizer with steady gait on departure.
Patient appears to be resting comfortably in stretcher; safety and comfort measures provided; VSS
Report received from YAZMIN April T in [place]
VSS. family at beside. Pt resting comfortably.

## 2023-02-07 NOTE — DIETITIAN INITIAL EVALUATION ADULT. - NS AS NUTRI DX NUTRIENT
Inadequate protein-energy intake on the discharge service for the patient. I have reviewed and made amendments to the documentation where necessary.